# Patient Record
Sex: FEMALE | Race: BLACK OR AFRICAN AMERICAN | NOT HISPANIC OR LATINO | Employment: FULL TIME | URBAN - METROPOLITAN AREA
[De-identification: names, ages, dates, MRNs, and addresses within clinical notes are randomized per-mention and may not be internally consistent; named-entity substitution may affect disease eponyms.]

---

## 2017-03-23 ENCOUNTER — HISTORICAL (OUTPATIENT)
Dept: ADMINISTRATIVE | Facility: HOSPITAL | Age: 52
End: 2017-03-23

## 2017-06-23 ENCOUNTER — HISTORICAL (OUTPATIENT)
Dept: RADIOLOGY | Facility: HOSPITAL | Age: 52
End: 2017-06-23

## 2017-12-21 ENCOUNTER — HISTORICAL (OUTPATIENT)
Dept: RADIOLOGY | Facility: HOSPITAL | Age: 52
End: 2017-12-21

## 2018-10-01 LAB — CRC RECOMMENDATION EXT: NORMAL

## 2018-11-12 ENCOUNTER — HISTORICAL (OUTPATIENT)
Dept: RADIOLOGY | Facility: HOSPITAL | Age: 53
End: 2018-11-12

## 2018-12-28 ENCOUNTER — HISTORICAL (OUTPATIENT)
Dept: ADMINISTRATIVE | Facility: HOSPITAL | Age: 53
End: 2018-12-28

## 2018-12-30 LAB — FINAL CULTURE: NORMAL

## 2019-01-28 ENCOUNTER — HISTORICAL (OUTPATIENT)
Dept: LAB | Facility: HOSPITAL | Age: 54
End: 2019-01-28

## 2019-02-07 LAB
LEFT EYE DM RETINOPATHY: NORMAL
RIGHT EYE DM RETINOPATHY: NORMAL

## 2019-03-07 ENCOUNTER — HISTORICAL (OUTPATIENT)
Dept: ADMINISTRATIVE | Facility: HOSPITAL | Age: 54
End: 2019-03-07

## 2019-08-13 ENCOUNTER — HISTORICAL (OUTPATIENT)
Dept: ADMINISTRATIVE | Facility: HOSPITAL | Age: 54
End: 2019-08-13

## 2019-08-13 LAB
ABS NEUT (OLG): 3.43 X10(3)/MCL (ref 2.1–9.2)
ALBUMIN SERPL-MCNC: 3.2 GM/DL (ref 3.4–5)
ALBUMIN/GLOB SERPL: 0.8 {RATIO}
ALP SERPL-CCNC: 99 UNIT/L (ref 38–126)
ALT SERPL-CCNC: 12 UNIT/L (ref 12–78)
APPEARANCE, UA: CLEAR
AST SERPL-CCNC: 8 UNIT/L (ref 15–37)
BACTERIA SPEC CULT: ABNORMAL /HPF
BASOPHILS # BLD AUTO: 0.1 X10(3)/MCL (ref 0–0.2)
BASOPHILS NFR BLD AUTO: 1 %
BILIRUB SERPL-MCNC: 0.3 MG/DL (ref 0.2–1)
BILIRUB UR QL STRIP: NEGATIVE
BILIRUBIN DIRECT+TOT PNL SERPL-MCNC: 0.1 MG/DL (ref 0–0.2)
BILIRUBIN DIRECT+TOT PNL SERPL-MCNC: 0.2 MG/DL (ref 0–0.8)
BUN SERPL-MCNC: 15 MG/DL (ref 7–18)
CALCIUM SERPL-MCNC: 8.8 MG/DL (ref 8.5–10.1)
CHLORIDE SERPL-SCNC: 106 MMOL/L (ref 98–107)
CHOLEST SERPL-MCNC: 86 MG/DL (ref 0–200)
CHOLEST/HDLC SERPL: 2.7 {RATIO} (ref 0–4)
CO2 SERPL-SCNC: 27 MMOL/L (ref 21–32)
COLOR UR: YELLOW
CREAT SERPL-MCNC: 0.79 MG/DL (ref 0.55–1.02)
CREAT UR-MCNC: 299 MG/DL
EOSINOPHIL # BLD AUTO: 0.2 X10(3)/MCL (ref 0–0.9)
EOSINOPHIL NFR BLD AUTO: 3 %
ERYTHROCYTE [DISTWIDTH] IN BLOOD BY AUTOMATED COUNT: 16.3 % (ref 11.5–17)
EST. AVERAGE GLUCOSE BLD GHB EST-MCNC: 143 MG/DL
GLOBULIN SER-MCNC: 4.2 GM/DL (ref 2.4–3.5)
GLUCOSE (UA): NEGATIVE
GLUCOSE SERPL-MCNC: 100 MG/DL (ref 74–106)
HBA1C MFR BLD: 6.6 % (ref 4.2–6.3)
HCT VFR BLD AUTO: 42.1 % (ref 37–47)
HDLC SERPL-MCNC: 32 MG/DL (ref 35–60)
HGB BLD-MCNC: 12.9 GM/DL (ref 12–16)
HGB UR QL STRIP: NEGATIVE
KETONES UR QL STRIP: NEGATIVE
LDLC SERPL CALC-MCNC: 40 MG/DL (ref 0–129)
LEUKOCYTE ESTERASE UR QL STRIP: ABNORMAL
LYMPHOCYTES # BLD AUTO: 3 X10(3)/MCL (ref 0.6–4.6)
LYMPHOCYTES NFR BLD AUTO: 42 %
MCH RBC QN AUTO: 23.2 PG (ref 27–31)
MCHC RBC AUTO-ENTMCNC: 30.6 GM/DL (ref 33–36)
MCV RBC AUTO: 75.9 FL (ref 80–94)
MICROALBUMIN UR-MCNC: 4.1 MG/DL
MICROALBUMIN/CREAT RATIO PNL UR: 13.7 MG/GM CR (ref 0–30)
MONOCYTES # BLD AUTO: 0.5 X10(3)/MCL (ref 0.1–1.3)
MONOCYTES NFR BLD AUTO: 7 %
NEUTROPHILS # BLD AUTO: 3.43 X10(3)/MCL (ref 2.1–9.2)
NEUTROPHILS NFR BLD AUTO: 47 %
NITRITE UR QL STRIP: NEGATIVE
PH UR STRIP: 5.5 [PH] (ref 5–9)
PLATELET # BLD AUTO: 456 X10(3)/MCL (ref 130–400)
PMV BLD AUTO: 9.3 FL (ref 9.4–12.4)
POTASSIUM SERPL-SCNC: 3.7 MMOL/L (ref 3.5–5.1)
PROT SERPL-MCNC: 7.4 GM/DL (ref 6.4–8.2)
PROT UR QL STRIP: ABNORMAL
RBC # BLD AUTO: 5.55 X10(6)/MCL (ref 4.2–5.4)
RBC #/AREA URNS HPF: ABNORMAL /[HPF]
SODIUM SERPL-SCNC: 142 MMOL/L (ref 136–145)
SP GR UR STRIP: 1.02 (ref 1–1.03)
SQUAMOUS EPITHELIAL, UA: 6 /HPF (ref 0–4)
TRIGL SERPL-MCNC: 72 MG/DL (ref 30–150)
UROBILINOGEN UR STRIP-ACNC: 1
VLDLC SERPL CALC-MCNC: 14 MG/DL
WBC # SPEC AUTO: 7.3 X10(3)/MCL (ref 4.5–11.5)
WBC #/AREA URNS HPF: 6 /HPF (ref 0–3)

## 2019-11-22 ENCOUNTER — HISTORICAL (OUTPATIENT)
Dept: RADIOLOGY | Facility: HOSPITAL | Age: 54
End: 2019-11-22

## 2020-12-16 ENCOUNTER — HISTORICAL (OUTPATIENT)
Dept: RADIOLOGY | Facility: HOSPITAL | Age: 55
End: 2020-12-16

## 2021-01-18 ENCOUNTER — HISTORICAL (OUTPATIENT)
Dept: ADMINISTRATIVE | Facility: HOSPITAL | Age: 56
End: 2021-01-18

## 2022-02-02 ENCOUNTER — HISTORICAL (OUTPATIENT)
Dept: ADMINISTRATIVE | Facility: HOSPITAL | Age: 57
End: 2022-02-02

## 2022-02-02 LAB
ABS NEUT (OLG): 4.36 (ref 2.1–9.2)
ALBUMIN SERPL-MCNC: 3.3 G/DL (ref 3.5–5)
ALBUMIN/GLOB SERPL: 0.8 {RATIO} (ref 1.1–2)
ALP SERPL-CCNC: 89 U/L (ref 40–150)
ALT SERPL-CCNC: 7 U/L (ref 0–55)
APPEARANCE, UA: CLEAR
AST SERPL-CCNC: 9 U/L (ref 5–34)
BACTERIA SPEC CULT: NORMAL
BASOPHILS # BLD AUTO: 0.1 10*3/UL (ref 0–0.2)
BASOPHILS NFR BLD AUTO: 1 %
BILIRUB SERPL-MCNC: 0.5 MG/DL
BILIRUB UR QL STRIP: NEGATIVE
BILIRUBIN DIRECT+TOT PNL SERPL-MCNC: 0.2 (ref 0–0.5)
BILIRUBIN DIRECT+TOT PNL SERPL-MCNC: 0.3 (ref 0–0.8)
BUDDING YEAST: NORMAL
BUN SERPL-MCNC: 16.5 MG/DL (ref 9.8–20.1)
CALCIUM SERPL-MCNC: 9 MG/DL (ref 8.7–10.5)
CASTS, UA: NORMAL
CHLORIDE SERPL-SCNC: 106 MMOL/L (ref 98–107)
CHOLEST SERPL-MCNC: 110 MG/DL
CHOLEST/HDLC SERPL: 4 {RATIO} (ref 0–5)
CO2 SERPL-SCNC: 26 MMOL/L (ref 22–29)
COLOR UR: YELLOW
CREAT SERPL-MCNC: 0.69 MG/DL (ref 0.55–1.02)
CREAT UR-MCNC: 87.5 MG/DL (ref 45–106)
CRYSTALS: NORMAL
DEPRECATED CALCIDIOL+CALCIFEROL SERPL-MC: 38.2 NG/ML (ref 30–80)
EOSINOPHIL # BLD AUTO: 0.3 10*3/UL (ref 0–0.9)
EOSINOPHIL NFR BLD AUTO: 4 %
ERYTHROCYTE [DISTWIDTH] IN BLOOD BY AUTOMATED COUNT: 16 % (ref 11.5–17)
EST. AVERAGE GLUCOSE BLD GHB EST-MCNC: 139.8 MG/DL
GLOBULIN SER-MCNC: 4.1 G/DL (ref 2.4–3.5)
GLUCOSE (UA): NEGATIVE
GLUCOSE SERPL-MCNC: 89 MG/DL (ref 74–100)
HBA1C MFR BLD: 6.5 %
HCT VFR BLD AUTO: 40.8 % (ref 37–47)
HDLC SERPL-MCNC: 27 MG/DL (ref 35–60)
HEMOLYSIS INTERF INDEX SERPL-ACNC: 9
HGB BLD-MCNC: 12.4 G/DL (ref 12–16)
HGB UR QL STRIP: NEGATIVE
ICTERIC INTERF INDEX SERPL-ACNC: 0
KETONES UR QL STRIP: NEGATIVE
LDLC SERPL CALC-MCNC: 68 MG/DL (ref 50–140)
LEUKOCYTE ESTERASE UR QL STRIP: NEGATIVE
LIPEMIC INTERF INDEX SERPL-ACNC: 7
LYMPHOCYTES # BLD AUTO: 2.6 10*3/UL (ref 0.6–4.6)
LYMPHOCYTES NFR BLD AUTO: 33 %
MANUAL DIFF? (OHS): NO
MCH RBC QN AUTO: 23.2 PG (ref 27–31)
MCHC RBC AUTO-ENTMCNC: 30.4 G/DL (ref 33–36)
MCV RBC AUTO: 76.4 FL (ref 80–94)
MICROALBUMIN UR-MCNC: 20.4
MICROALBUMIN/CREAT RATIO PNL UR: 23.3 (ref 0–30)
MONOCYTES # BLD AUTO: 0.6 10*3/UL (ref 0.1–1.3)
MONOCYTES NFR BLD AUTO: 7 %
NEUTROPHILS # BLD AUTO: 4.36 10*3/UL (ref 2.1–9.2)
NEUTROPHILS NFR BLD AUTO: 55 %
NITRITE UR QL STRIP: NEGATIVE
PH UR STRIP: 6 [PH] (ref 5–9)
PLATELET # BLD AUTO: 436 10*3/UL (ref 130–400)
PMV BLD AUTO: 9.6 FL (ref 9.4–12.4)
POTASSIUM SERPL-SCNC: 4 MMOL/L (ref 3.5–5.1)
PROT SERPL-MCNC: 7.4 G/DL (ref 6.4–8.3)
PROT UR QL STRIP: NEGATIVE
RBC # BLD AUTO: 5.34 10*6/UL (ref 4.2–5.4)
RBC #/AREA URNS HPF: NORMAL /[HPF] (ref 0–2)
SMALL ROUND CELLS, UA: NORMAL
SODIUM SERPL-SCNC: 141 MMOL/L (ref 136–145)
SP GR UR STRIP: 1.01 (ref 1–1.03)
SPERM URNS QL MICRO: NORMAL
SQUAMOUS EPITHELIAL, UA: NORMAL (ref 0–4)
TRIGL SERPL-MCNC: 74 MG/DL (ref 37–140)
TSH SERPL-ACNC: 1.67 M[IU]/L (ref 0.35–4.94)
URATE SERPL-MCNC: 7.9 MG/DL (ref 2.6–6)
UROBILINOGEN UR STRIP-ACNC: 0.2
VLDLC SERPL CALC-MCNC: 15 MG/DL
WBC # SPEC AUTO: 8 10*3/UL (ref 4.5–11.5)
WBC #/AREA URNS HPF: NORMAL /[HPF] (ref 0–2)

## 2022-02-18 ENCOUNTER — HISTORICAL (OUTPATIENT)
Dept: RADIOLOGY | Facility: HOSPITAL | Age: 57
End: 2022-02-18

## 2022-02-18 ENCOUNTER — HISTORICAL (OUTPATIENT)
Dept: ADMINISTRATIVE | Facility: HOSPITAL | Age: 57
End: 2022-02-18

## 2022-04-07 LAB
PAP RECOMMENDATION EXT: NORMAL
PAP SMEAR: NORMAL

## 2022-04-10 ENCOUNTER — HISTORICAL (OUTPATIENT)
Dept: ADMINISTRATIVE | Facility: HOSPITAL | Age: 57
End: 2022-04-10
Payer: COMMERCIAL

## 2022-04-29 VITALS
SYSTOLIC BLOOD PRESSURE: 130 MMHG | HEIGHT: 67 IN | OXYGEN SATURATION: 99 % | BODY MASS INDEX: 45.99 KG/M2 | WEIGHT: 293 LBS | DIASTOLIC BLOOD PRESSURE: 86 MMHG

## 2022-06-16 ENCOUNTER — OFFICE VISIT (OUTPATIENT)
Dept: INTERNAL MEDICINE | Facility: CLINIC | Age: 57
End: 2022-06-16
Payer: COMMERCIAL

## 2022-06-16 VITALS
SYSTOLIC BLOOD PRESSURE: 134 MMHG | OXYGEN SATURATION: 97 % | BODY MASS INDEX: 45.99 KG/M2 | HEART RATE: 114 BPM | HEIGHT: 67 IN | DIASTOLIC BLOOD PRESSURE: 79 MMHG | TEMPERATURE: 98 F | WEIGHT: 293 LBS

## 2022-06-16 DIAGNOSIS — S29.012A MUSCLE STRAIN OF RIGHT UPPER BACK, INITIAL ENCOUNTER: Primary | ICD-10-CM

## 2022-06-16 PROBLEM — E66.01 MORBID OBESITY: Chronic | Status: ACTIVE | Noted: 2022-06-16

## 2022-06-16 PROBLEM — I10 HYPERTENSION: Status: ACTIVE | Noted: 2022-06-16

## 2022-06-16 PROBLEM — M12.9 ARTHROPATHY: Status: ACTIVE | Noted: 2022-06-16

## 2022-06-16 PROBLEM — E11.9 TYPE 2 DIABETES MELLITUS WITHOUT COMPLICATION: Status: ACTIVE | Noted: 2018-12-28

## 2022-06-16 PROBLEM — E66.01 MORBID OBESITY: Status: ACTIVE | Noted: 2022-06-16

## 2022-06-16 PROBLEM — I10 HYPERTENSION: Chronic | Status: ACTIVE | Noted: 2022-06-16

## 2022-06-16 PROCEDURE — 3061F PR NEG MICROALBUMINURIA RESULT DOCUMENTED/REVIEW: ICD-10-PCS | Mod: CPTII,,, | Performed by: NURSE PRACTITIONER

## 2022-06-16 PROCEDURE — 3061F NEG MICROALBUMINURIA REV: CPT | Mod: CPTII,,, | Performed by: NURSE PRACTITIONER

## 2022-06-16 PROCEDURE — 96372 PR INJECTION,THERAP/PROPH/DIAG2ST, IM OR SUBCUT: ICD-10-PCS | Mod: ,,, | Performed by: NURSE PRACTITIONER

## 2022-06-16 PROCEDURE — 96372 THER/PROPH/DIAG INJ SC/IM: CPT | Mod: ,,, | Performed by: NURSE PRACTITIONER

## 2022-06-16 PROCEDURE — 99214 OFFICE O/P EST MOD 30 MIN: CPT | Mod: 25,,, | Performed by: NURSE PRACTITIONER

## 2022-06-16 PROCEDURE — 99214 PR OFFICE/OUTPT VISIT, EST, LEVL IV, 30-39 MIN: ICD-10-PCS | Mod: 25,,, | Performed by: NURSE PRACTITIONER

## 2022-06-16 PROCEDURE — 3066F NEPHROPATHY DOC TX: CPT | Mod: CPTII,,, | Performed by: NURSE PRACTITIONER

## 2022-06-16 PROCEDURE — 3075F SYST BP GE 130 - 139MM HG: CPT | Mod: CPTII,,, | Performed by: NURSE PRACTITIONER

## 2022-06-16 PROCEDURE — 1159F MED LIST DOCD IN RCRD: CPT | Mod: CPTII,,, | Performed by: NURSE PRACTITIONER

## 2022-06-16 PROCEDURE — 1159F PR MEDICATION LIST DOCUMENTED IN MEDICAL RECORD: ICD-10-PCS | Mod: CPTII,,, | Performed by: NURSE PRACTITIONER

## 2022-06-16 PROCEDURE — 3078F DIAST BP <80 MM HG: CPT | Mod: CPTII,,, | Performed by: NURSE PRACTITIONER

## 2022-06-16 PROCEDURE — 3075F PR MOST RECENT SYSTOLIC BLOOD PRESS GE 130-139MM HG: ICD-10-PCS | Mod: CPTII,,, | Performed by: NURSE PRACTITIONER

## 2022-06-16 PROCEDURE — 3008F BODY MASS INDEX DOCD: CPT | Mod: CPTII,,, | Performed by: NURSE PRACTITIONER

## 2022-06-16 PROCEDURE — 3066F PR DOCUMENTATION OF TREATMENT FOR NEPHROPATHY: ICD-10-PCS | Mod: CPTII,,, | Performed by: NURSE PRACTITIONER

## 2022-06-16 PROCEDURE — 1160F PR REVIEW ALL MEDS BY PRESCRIBER/CLIN PHARMACIST DOCUMENTED: ICD-10-PCS | Mod: CPTII,,, | Performed by: NURSE PRACTITIONER

## 2022-06-16 PROCEDURE — 3078F PR MOST RECENT DIASTOLIC BLOOD PRESSURE < 80 MM HG: ICD-10-PCS | Mod: CPTII,,, | Performed by: NURSE PRACTITIONER

## 2022-06-16 PROCEDURE — 3008F PR BODY MASS INDEX (BMI) DOCUMENTED: ICD-10-PCS | Mod: CPTII,,, | Performed by: NURSE PRACTITIONER

## 2022-06-16 PROCEDURE — 1160F RVW MEDS BY RX/DR IN RCRD: CPT | Mod: CPTII,,, | Performed by: NURSE PRACTITIONER

## 2022-06-16 RX ORDER — AMLODIPINE BESYLATE 5 MG/1
TABLET ORAL
COMMUNITY
Start: 2022-02-07 | End: 2023-01-23

## 2022-06-16 RX ORDER — KETOROLAC TROMETHAMINE 10 MG/1
10 TABLET, FILM COATED ORAL EVERY 6 HOURS PRN
Qty: 20 TABLET | Refills: 0 | Status: SHIPPED | OUTPATIENT
Start: 2022-06-16 | End: 2022-06-21

## 2022-06-16 RX ORDER — KETOROLAC TROMETHAMINE 30 MG/ML
30 INJECTION, SOLUTION INTRAMUSCULAR; INTRAVENOUS
Status: COMPLETED | OUTPATIENT
Start: 2022-06-16 | End: 2022-06-16

## 2022-06-16 RX ORDER — CYCLOBENZAPRINE HCL 10 MG
10 TABLET ORAL 3 TIMES DAILY PRN
Qty: 30 TABLET | Refills: 0 | Status: SHIPPED | OUTPATIENT
Start: 2022-06-16 | End: 2022-06-26

## 2022-06-16 RX ORDER — ASPIRIN 81 MG/1
81 TABLET ORAL DAILY
COMMUNITY

## 2022-06-16 RX ADMIN — KETOROLAC TROMETHAMINE 30 MG: 30 INJECTION, SOLUTION INTRAMUSCULAR; INTRAVENOUS at 11:06

## 2022-06-16 NOTE — PROGRESS NOTES
Patient ID: [id number]     Chief Complaint: Back Pain and armpit pain         HPI:     Teresita Brian is a 56 y.o. female patient of Dr. Frances who presents to clinic today with c/o right upper back pain x 1 week.  Pain started after reaching down to tie her shoe.  Pain exacerbated while at work on the computer and when she wakes up  In the morning, has not taken anything for pain relief.  Pain described as dull ache.         Past Medical History:  Essential (primary) hypertension  Hypertension  Morbid obesity  Type 2 diabetes mellitus without complication  Type 2 diabetes mellitus without complication, without long-term   current use of insulin  Vitamin D deficiency     Past Surgical History:   Procedure Laterality Date     SECTION         Review of patient's allergies indicates:   Allergen Reactions    Ace inhibitors        Outpatient Medications Marked as Taking for the 22 encounter (Office Visit) with FROY Cowan   Medication Sig Dispense Refill    amLODIPine (NORVASC) 5 MG tablet   See Instructions, TAKE ONE TABLET BY MOUTH DAILY, # 90 tab(s), 4 Refill(s), Pharmacy: [facility], 170, cm, Height/Length Dosing, 21 13:34:00 CST, 147.41, kg, Weight Dosing, 21 13:34:00 CST      aspirin (ECOTRIN) 81 MG EC tablet Take 81 mg by mouth once daily.       Current Facility-Administered Medications for the 22 encounter (Office Visit) with FROY Cowan   Medication Dose Route Frequency Provider Last Rate Last Admin    ketorolac injection 30 mg  30 mg Intramuscular 1 time in Clinic/HOD FROY Cowan           Social History     Socioeconomic History    Marital status:    Tobacco Use    Smoking status: Never Smoker    Smokeless tobacco: Never Used   Substance and Sexual Activity    Alcohol use: Never    Drug use: Never        Family History   Problem Relation Age of Onset    Diabetes Mother     Diabetes Sister     Diabetes Brother      "    Subjective:     ROS      See HPI for details    Constitutional: Denies Change in appetite. Denies Chills. Denies Fever. Denies Night sweats.  Musculoskeletal: + r upper back pain  Integumentary: Denies Rash. Denies Itching. Denies Dry skin.  Neurologic: Denies Dizziness. Denies Fainting. Denies Headache.    All Other ROS: Negative except as stated in HPI.       Objective:     /79 (BP Location: Right arm, Patient Position: Sitting, BP Method: Large (Automatic))   Pulse (!) 114   Temp 98.2 °F (36.8 °C) (Temporal)   Ht 5' 7" (1.702 m)   Wt (!) 144.7 kg (319 lb)   SpO2 97%   BMI 49.96 kg/m²     Physical Exam    General: Alert and oriented, No acute distress.  Head: Normocephalic,   Respiratory:  Symmetrical chest wall expansion.  Musculoskeletal: Normal range of motion cervical spine, cervical spine nontender to palpation.  Right AC joint nontender to palpation.  Paraspinal cervical/thoracic muscles on the right side tender to palpation  Integumentary: Warm, Dry, Intact,   Neurologic: No focal deficits  Psychiatric: Normal interaction, , Appropriate affect         Assessment:       ICD-10-CM ICD-9-CM   1. Muscle strain of right upper back, initial encounter  S29.012A 847.1        Plan:     1. Muscle strain of right upper back, initial encounter  - ketorolac injection 30 mg  - ketorolac (TORADOL) 10 mg tablet; Take 1 tablet (10 mg total) by mouth every 6 (six) hours as needed for Pain.  Dispense: 20 tablet; Refill: 0  - cyclobenzaprine (FLEXERIL) 10 MG tablet; Take 1 tablet (10 mg total) by mouth 3 (three) times daily as needed for Muscle spasms.  Dispense: 30 tablet; Refill: 0  rotate ice and heat  Exercise handout provided         Medication List with Changes/Refills   New Medications    CYCLOBENZAPRINE (FLEXERIL) 10 MG TABLET    Take 1 tablet (10 mg total) by mouth 3 (three) times daily as needed for Muscle spasms.       Start Date: 6/16/2022 End Date: 6/26/2022    KETOROLAC (TORADOL) 10 MG TABLET    " Take 1 tablet (10 mg total) by mouth every 6 (six) hours as needed for Pain.       Start Date: 6/16/2022 End Date: 6/21/2022   Current Medications    AMLODIPINE (NORVASC) 5 MG TABLET      See Instructions, TAKE ONE TABLET BY MOUTH DAILY, # 90 tab(s), 4 Refill(s), Pharmacy: [facility], 170, cm, Height/Length Dosing, 01/20/21 13:34:00 CST, 147.41, kg, Weight Dosing, 01/20/21 13:34:00 CST       Start Date: 2/7/2022  End Date: --    ASPIRIN (ECOTRIN) 81 MG EC TABLET    Take 81 mg by mouth once daily.       Start Date: --        End Date: --          Follow up if symptoms worsen or fail to improve.

## 2022-08-22 ENCOUNTER — HOSPITAL ENCOUNTER (OUTPATIENT)
Dept: CARDIOLOGY | Facility: HOSPITAL | Age: 57
Discharge: HOME | End: 2022-08-22
Attending: INTERNAL MEDICINE
Payer: COMMERCIAL

## 2022-08-22 ENCOUNTER — OFFICE VISIT (OUTPATIENT)
Dept: INTERNAL MEDICINE | Facility: CLINIC | Age: 57
End: 2022-08-22
Payer: COMMERCIAL

## 2022-08-22 VITALS
WEIGHT: 293 LBS | HEART RATE: 115 BPM | BODY MASS INDEX: 45.99 KG/M2 | OXYGEN SATURATION: 98 % | HEIGHT: 67 IN | DIASTOLIC BLOOD PRESSURE: 100 MMHG | SYSTOLIC BLOOD PRESSURE: 140 MMHG

## 2022-08-22 DIAGNOSIS — M79.605 PAIN OF LEFT LOWER EXTREMITY: Primary | ICD-10-CM

## 2022-08-22 DIAGNOSIS — M79.605 PAIN OF LEFT LOWER EXTREMITY: ICD-10-CM

## 2022-08-22 PROCEDURE — 1159F MED LIST DOCD IN RCRD: CPT | Mod: CPTII,,, | Performed by: INTERNAL MEDICINE

## 2022-08-22 PROCEDURE — 1159F PR MEDICATION LIST DOCUMENTED IN MEDICAL RECORD: ICD-10-PCS | Mod: CPTII,,, | Performed by: INTERNAL MEDICINE

## 2022-08-22 PROCEDURE — 3080F DIAST BP >= 90 MM HG: CPT | Mod: CPTII,,, | Performed by: INTERNAL MEDICINE

## 2022-08-22 PROCEDURE — 3008F PR BODY MASS INDEX (BMI) DOCUMENTED: ICD-10-PCS | Mod: CPTII,,, | Performed by: INTERNAL MEDICINE

## 2022-08-22 PROCEDURE — 93971 EXTREMITY STUDY: CPT | Mod: LT

## 2022-08-22 PROCEDURE — 1160F PR REVIEW ALL MEDS BY PRESCRIBER/CLIN PHARMACIST DOCUMENTED: ICD-10-PCS | Mod: CPTII,,, | Performed by: INTERNAL MEDICINE

## 2022-08-22 PROCEDURE — 99214 OFFICE O/P EST MOD 30 MIN: CPT | Mod: ,,, | Performed by: INTERNAL MEDICINE

## 2022-08-22 PROCEDURE — 3061F NEG MICROALBUMINURIA REV: CPT | Mod: CPTII,,, | Performed by: INTERNAL MEDICINE

## 2022-08-22 PROCEDURE — 3008F BODY MASS INDEX DOCD: CPT | Mod: CPTII,,, | Performed by: INTERNAL MEDICINE

## 2022-08-22 PROCEDURE — 3066F NEPHROPATHY DOC TX: CPT | Mod: CPTII,,, | Performed by: INTERNAL MEDICINE

## 2022-08-22 PROCEDURE — 1160F RVW MEDS BY RX/DR IN RCRD: CPT | Mod: CPTII,,, | Performed by: INTERNAL MEDICINE

## 2022-08-22 PROCEDURE — 3066F PR DOCUMENTATION OF TREATMENT FOR NEPHROPATHY: ICD-10-PCS | Mod: CPTII,,, | Performed by: INTERNAL MEDICINE

## 2022-08-22 PROCEDURE — 3077F PR MOST RECENT SYSTOLIC BLOOD PRESSURE >= 140 MM HG: ICD-10-PCS | Mod: CPTII,,, | Performed by: INTERNAL MEDICINE

## 2022-08-22 PROCEDURE — 3061F PR NEG MICROALBUMINURIA RESULT DOCUMENTED/REVIEW: ICD-10-PCS | Mod: CPTII,,, | Performed by: INTERNAL MEDICINE

## 2022-08-22 PROCEDURE — 3080F PR MOST RECENT DIASTOLIC BLOOD PRESSURE >= 90 MM HG: ICD-10-PCS | Mod: CPTII,,, | Performed by: INTERNAL MEDICINE

## 2022-08-22 PROCEDURE — 3077F SYST BP >= 140 MM HG: CPT | Mod: CPTII,,, | Performed by: INTERNAL MEDICINE

## 2022-08-22 PROCEDURE — 99214 PR OFFICE/OUTPT VISIT, EST, LEVL IV, 30-39 MIN: ICD-10-PCS | Mod: ,,, | Performed by: INTERNAL MEDICINE

## 2022-08-22 NOTE — PROGRESS NOTES
Subjective:      Patient ID: Teresita Brian is a 56 y.o. female.    Chief Complaint: Leg Pain      HPI:  This patient is a 56-year-old established patient who presents with anterior thigh pain involving the left lower leg.  She took a trip to [place] within the past several weeks, and she began to have pain in the anterior aspect of her right thigh, shortly after she arrived in [place].  She remembers having to walk a long distance, and at that moment in time, her pain began to develop.  It did not evolve or get worse.  She begin to take an anti-inflammatory agent, and it seemed to help, so she completed her trip, and returned home.  Because her pain persisted, she came in for an evaluation.  She has not had any pulmonary symptoms or complaints.  She has no known history of any venous thrombosis in the past.  She has no involvement of the opposite leg.     The patient's Health Maintenance was reviewed and the following appears to be due at this time:   Health Maintenance Due   Topic Date Due    Hepatitis C Screening  Never done    HIV Screening  Never done    TETANUS VACCINE  Never done    Shingles Vaccine (1 of 2) Never done    COVID-19 Vaccine (4 - Booster for Pfizer series) 02/28/2022        Recent Labwork  No visits with results within 1 Month(s) from this visit.   Latest known visit with results is:   Historical on 02/02/2022   Component Date Value Ref Range Status    Neut % 02/02/2022 55   Final    Lymph % 02/02/2022 33   Final    Mono % 02/02/2022 7   Final    Eos % 02/02/2022 4   Final    Basophil % 02/02/2022 1   Final    Lymph # 02/02/2022 2.6  0.6 - 4.6 Final    Neut # 02/02/2022 4.36  2.10 - 9.20 Final    Mono # 02/02/2022 0.6  0.1 - 1.3 Final    Eos # 02/02/2022 0.3  0.0 - 0.9 Final    Baso # 02/02/2022 0.1  0.0 - 0.2 Final    WBC 02/02/2022 8.0  4.5 - 11.5 Final    RBC 02/02/2022 5.34  4.20 - 5.40 Final    Hgb 02/02/2022 12.4  12.0 - 16.0 Final    Hct 02/02/2022 40.8  37.0 -  47.0 Final    MCV 02/02/2022 76.4  80.0 - 94.0 Final    MCH 02/02/2022 23.2  27.0 - 31.0 Final    MCHC 02/02/2022 30.4  33.0 - 36.0 Final    RDW 02/02/2022 16.0  11.5 - 17.0 Final    Platelet 02/02/2022 436  130 - 400 Final    MPV 02/02/2022 9.6  9.4 - 12.4 Final    Abs Neut 02/02/2022 4.36  2.10 - 9.20 Final    Manual Diff? 02/02/2022 No   Final    Hemoglobin A1c 02/02/2022 6.5  <=7.0 Final    Estimated Average Glucose 02/02/2022 139.8   Final    Color, UA 02/02/2022 YELLOW  YELLOW Final    Appearance, UA 02/02/2022 CLEAR  Clear Final    Specific Gravity,UA 02/02/2022 1.015  1.000 - 1.032 Final    pH, UA 02/02/2022 6.0  5.0 - 9.0 Final    Protein, UA 02/02/2022 Negative  Negative Final    Glucose, UA 02/02/2022 Negative  Negative Final    Ketones, UA 02/02/2022 Negative  Negative Final    Bilirubin (UA) 02/02/2022 Negative  Negative Final    Occult Blood UA 02/02/2022 Negative  Negative Final    Urobilinogen, UA 02/02/2022 0.2   Final    Nitrite, UA 02/02/2022 Negative  Negative Final    Leukocytes, UA 02/02/2022 Negative  Negative Final    WBC, UA 02/02/2022 NONE SEEN  0 - 2 Final    RBC, UA 02/02/2022 NONE SEEN  0 - 2 Final    Squam Epithel, UA 02/02/2022 NONE SEEN  0 - 4 Final    Bacteria 02/02/2022 NONE SEEN  None Seen Final    Yeast, UA 02/02/2022 NOT PRESENT   Final    Sperm, UA 02/02/2022 NOT PRESENT   Final    Crystals 02/02/2022 NOT PRESENT   Final    Small Round Cells, UA 02/02/2022 NOT PRESENT   Final    CASTS, UA 02/02/2022 NONE SEEN   Final    Cholesterol Total 02/02/2022 110  <=200 Final    HDL Cholesterol 02/02/2022 27  35 - 60 Final    Triglyceride 02/02/2022 74  37 - 140 Final    Very Low Density Lipoprotein 02/02/2022 15   Final    Cholesterol/HDL Ratio 02/02/2022 4  0 - 5 Final    LDL Cholesterol 02/02/2022 68.00  50.00 - 140.00 Final    Sodium Level 02/02/2022 141  136 - 145 Final    Potassium Level 02/02/2022 4.0  3.5 - 5.1 Final    Chloride 02/02/2022  106  98 - 107 Final    Carbon Dioxide 2022 26  22 - 29 Final    Glucose Level 2022 89  74 - 100 Final    Blood Urea Nitrogen 2022 16.5  9.8 - 20.1 Final    Creatinine 2022 0.69  0.55 - 1.02 Final    Calcium Level Total 2022 9.0  8.7 - 10.5 Final    Albumin Level 2022 3.3  3.5 - 5.0 Final    Protein Total 2022 7.4  6.4 - 8.3 Final    Globulin 2022 4.1  2.4 - 3.5 Final    Albumin/Globulin Ratio 2022 0.8  1.1 - 2.0 Final    Alkaline Phosphatase 2022 89  40 - 150 Final    Bilirubin Total 2022 0.5  <=1.5 Final    Bilirubin Direct 2022 0.2  0.0 - 0.5 Final    Bilirubin Indirect 2022 0.30  0.00 - 0.80 Final    Aspartate Aminotransferase 2022 9  5 - 34 Final    Alanine Aminotransferase 2022 7  0 - 55 Final    Hemolysis 2022 9   Final    Icterus 2022 0   Final    Lipemia 2022 7   Final    Thyroid Stimulating Hormone 2022 1.6663  0.3500 - 4.9400 Final    Uric Acid 2022 7.9  2.6 - 6.0 Final    Vit D 25 OH 2022 38.2  30.0 - 80.0 Final    Estimated GFR- 2022 >60   Final    Estimated GFR-Non  2022 >60   Final    Urine Microalbumin 2022 20.4  <=30.0 Final    Urine Creatinine 2022 87.5  45.0 - 106.0 Final    Microalbumin Creatinine Ratio 2022 23.3  0.0 - 30.0 Final       Past Medical History:  Past Medical History:   Diagnosis Date    Essential (primary) hypertension     Hypertension 2022    Morbid obesity     Type 2 diabetes mellitus without complication 2018    Type 2 diabetes mellitus without complication, without long-term current use of insulin     Vitamin D deficiency      Past Surgical History:   Procedure Laterality Date     SECTION       Review of patient's allergies indicates:   Allergen Reactions    Ace inhibitors      Current Outpatient Medications on File Prior to Visit   Medication  "Sig Dispense Refill    amLODIPine (NORVASC) 5 MG tablet   See Instructions, TAKE ONE TABLET BY MOUTH DAILY, # 90 tab(s), 4 Refill(s), Pharmacy: [facility], 170, cm, Height/Length Dosing, 01/20/21 13:34:00 CST, 147.41, kg, Weight Dosing, 01/20/21 13:34:00 CST      aspirin (ECOTRIN) 81 MG EC tablet Take 81 mg by mouth once daily.       No current facility-administered medications on file prior to visit.     Social History     Socioeconomic History    Marital status:    Tobacco Use    Smoking status: Never Smoker    Smokeless tobacco: Never Used   Substance and Sexual Activity    Alcohol use: Never    Drug use: Never    Sexual activity: Yes     Family History   Problem Relation Age of Onset    Diabetes Mother     Diabetes Sister     Diabetes Brother        Review of Systems  Review of Systems   Constitutional: Negative.    HENT: Negative.    Eyes: Negative.    Respiratory: Negative.    Cardiovascular: Negative.  As mentioned in the history of present illness  Gastrointestinal: Negative.    Genitourinary: Negative.    Musculoskeletal: Negative.    Neurological: Negative.    Endo/Heme/Allergies: Negative.    Psychiatric/Behavioral: Negative.    Objective:   BP (!) 140/100   Pulse (!) 115   Ht 5' 7" (1.702 m)   Wt (!) 144.2 kg (318 lb)   SpO2 98%   BMI 49.81 kg/m²         Physical Exam  Vitals and nursing note reviewed.   Constitutional:       General: He is not in acute distress.     Appearance: Normal appearance.   HENT:      Head: Normocephalic and atraumatic.      Right Ear: Tympanic membrane and ear canal normal.      Left Ear: Tympanic membrane and ear canal normal.      Nose: Nose normal.      Mouth/Throat:      Pharynx: Oropharynx is clear. No oropharyngeal exudate or posterior oropharyngeal erythema.   Eyes:      Extraocular Movements: Extraocular movements intact.      Pupils: Pupils are equal, round, and reactive to light.   Cardiovascular:      Rate and Rhythm: Normal rate and " regular rhythm.      Pulses: Normal pulses.      Heart sounds: Normal heart sounds. No murmur heard.    No friction rub. No gallop.  There is mild tenderness to  palpation in the left anterior thigh, with out any rigidity or guarding in this area.  This patient does have peripheral edema, but this exist bilaterally, and she is on an antihypertensive medication, amlodipine, that has been associated with peripheral edema.  Pulmonary:      Effort: Pulmonary effort is normal. No respiratory distress.      Breath sounds: Normal breath sounds.   Abdominal:      General: Abdomen is flat. Bowel sounds are normal.      Palpations: Abdomen is soft.   Genitourinary:     Rectum: Normal.   Musculoskeletal:         General: Normal range of motion.      Cervical back: Normal range of motion and neck supple.   Skin:     General: Skin is warm.      Capillary Refill: Capillary refill takes less than 2 seconds.   Neurological:      General: No focal deficit present.      Mental Status: He is alert and oriented to person, place, and time.   Psychiatric:         Mood and Affect: Mood normal.         Behavior: Behavior normal.         Thought Content: Thought content normal.         Judgment: Judgment normal.   Assessment:     1. Pain of left lower extremity      Plan:   I am having Teresita Brian maintain her amLODIPine and aspirin.  Because this patient took such a long trip to [place], and then back to [place], I would like to be sure that she does not have an underlying DVT, which has not been yet identified.  A venous NIVA will be taken, and she did point out that she had some discomfort in the right thigh, but it was less pronounced than in the left.  I will get DVT studies to both lower extremities, to make sure, and provide her with treatment as needed.  If there is no evidence for a DVT , she will be treated as if this is a musculoskeletal problem.  Problem List Items Addressed This Visit    None     Visit Diagnoses      Pain of left lower extremity    -  Primary    Relevant Orders    CV Ultrasound doppler venous DVT leg left          Teresita was seen today for leg pain.    Diagnoses and all orders for this visit:    Pain of left lower extremity  -     CV Ultrasound doppler venous DVT leg left; Future

## 2022-08-23 ENCOUNTER — TELEPHONE (OUTPATIENT)
Dept: INTERNAL MEDICINE | Facility: CLINIC | Age: 57
End: 2022-08-23
Payer: COMMERCIAL

## 2022-08-23 DIAGNOSIS — M79.605 PAIN OF LEFT LOWER EXTREMITY: Primary | ICD-10-CM

## 2022-08-23 RX ORDER — KETOROLAC TROMETHAMINE 10 MG/1
10 TABLET, FILM COATED ORAL 2 TIMES DAILY
Qty: 10 TABLET | Refills: 0 | Status: SHIPPED | OUTPATIENT
Start: 2022-08-23 | End: 2022-08-28

## 2022-08-23 NOTE — TELEPHONE ENCOUNTER
----- Message from Lucia Pierce sent at 8/23/2022  1:58 PM CDT -----  Regarding: call  Pt is returning call (no msg maybe to give US results)  [phone]

## 2022-10-24 ENCOUNTER — CLINICAL SUPPORT (OUTPATIENT)
Dept: BARIATRICS | Facility: HOSPITAL | Age: 57
End: 2022-10-24

## 2022-10-24 VITALS — HEIGHT: 66 IN | BODY MASS INDEX: 47.09 KG/M2 | WEIGHT: 293 LBS

## 2022-10-24 NOTE — PROGRESS NOTES
BEHAVIOR MODIFICATION AND EXERCISE CONSULT    Non Surgical: [] Surgical: [x]      PERSONAL:     What initiated your interest in bariatric surgery?  Tried everything to loose weight. I'm ready for a change.    Marital Status: []Single   []   [x]   []      Do you have children? 2    What is your highest level of education completed? Some college    Who is your social or relational support? family    Do you work? [x]Yes   []No   []Disabled   []Retired    If yes, what is your occupation?     Do you enjoy your work? Love my job    PHYSICAL ACTIVITY:    Do you currently exercise: []Yes   [x]No    If so, please describe:      Have you experienced any injuries and/or restrictions that may limit your physical activity? [x]Yes   []No      If so, please describe: R knee pain due to ART.    How do you feel about exercise? Like it. I like walking outdoors and weights.    BEHAVIORS:    What behavior(s) would you like to change in order to be healthy? Eating healthier, exercising, be better at procrastinating    On a scale of 1-10 (1-extremely low, 10-extremely high), how motivated are you to change your behavior(s)? 10    Do you currently use any type of tobacco products (vape, dip, cigarettes, etc.) No    If yes, on average, how many and/or how often do you use these products on a daily basis?    How many hours of sleep do you average? 7-8    Rate your stress level on a scale of 1-10 (1-extremely low, 10-extremely high) 5    What is your biggest stressor? Mom is ill.    Is your appetite affected by stress? No, boredom    How do you cope and/or manage stress? Get away, walk outside.    Have you ever seen a therapist or counselor? No    NOTES: A body composition was conducted and patient was educated on results. Current weight is 317.5. Patient's goal is 220 lbs. Waist/hip measurements are 60/62 inches. Handout: emotional connections to food and way to replace emotional eating.    Massiel Henry  BS, CPT, CHC

## 2022-10-24 NOTE — PROGRESS NOTES
NUTRITIONAL CONSULT    Initial assessment for  Not sure     Non Surgical: []      PAST HISTORY:   Dieting attempts include Pt has done diets in the past but it was never for her so she could keep up with it.   Highest weight: 325lb    CLINICAL DATA:  Height: 66in  Weight: 318 lbs  IBW: 130 lbs  BMI: 51.33  Bariatric goal weight (125% EBW): 162 lbs  Patient's goal weight: 220 lbs    FAMILY HISTORY OF OBESITY:   []Yes    [x]No            WHAT SIDE OF THE FAMILY?   []Mother   []Father   []Sibling   []Child     []Extended    []Adopted       Goal for Bariatric Surgery: to improve health, to improve quality of life, to lose weight, and to prevent future medical conditions    NUTRITION & HEALTH HISTORY:  Greatest challenge: sweets, starchy CHO, and irregular meal patterns    Current diet recall:   B: coffee with sugar and cream   L: burger and onion rings  -soda    S: soda   D: meat and vegetable sometimes will have some rice       Current Dietary Patterns:  Meal pattern: 2  Snackin  / day Type:   Vegetables: Likes a variety. Eats daily.  Fruits: Likes a variety. Eats once per week.  Beverages: water, soda, and coffee with sugar  Alcohol consumption:  socially  Type:   Dining out: Daily. Mostly fast food, restaurants, and take-out.  Grocery shopping and food prep: [x]Self   [x]Spouse   []Other:   Emotional eating: [x]Yes   []No Which emotions if yes: bored   Nighttime snacking: []Yes   [x]No Middle of night: []Yes   []No  Before bedtime: []Yes   []No  Hx of disordered eating behaviors: []Yes   [x]No Anorexia: []Yes   []No Bulimia: []Yes   []No  Purging: []Yes   []No Binging: []Yes   []No  History of vitamin/mineral deficiencies:   []Yes   [x]No    Vitamins / Minerals / Herbs:   MVI, fish oil B12 biton mg     Food Allergies:   None     ASSESSMENT:  Patient reports attempts at weight loss, only to regain lost weight.  Patient demonstrated knowledge of healthy eating behaviors and exercise patterns; admits to not eating  healthy and not exercising at this point.  Patient states willingness to change lifestyle and make behavior modifications.  Expect good  compliance after surgery at this time.        ESTIMATED NEEDS:  Calories:  8865-8438   (20-25 kcal/kg adjusted BW/d)  Protein: 74-82   (1.0-1.1 g/kg adjusted BW/d)  Fluid:  1480   (20 mL/kg actual BW/d)    BARIATRIC DIET DISCUSSION:  Discussed diet after surgery and related to patients food record.  Reviewed diet progression before and after surgery.  Reinforced that surgery is not a magic bullet and importance of low fat foods and no snacking.  Answered all questions.    RECOMMENDATIONS:  Patient is a good candidate for bariatric surgery.      PLAN:  Work on Bariatric Nutrition Checklist.  Work on expanding variety of vegetables.  1500-calorie diet.  5-6 meals per day.  Reduce frequency of dining out.  Start shopping for bariatric vitamins & minerals.

## 2022-11-04 ENCOUNTER — TELEPHONE (OUTPATIENT)
Dept: INTERNAL MEDICINE | Facility: CLINIC | Age: 57
End: 2022-11-04
Payer: COMMERCIAL

## 2022-11-04 NOTE — TELEPHONE ENCOUNTER
Did some research on the requested injection.    Wegovy Semaglutide injection.  5 doses available from .25mg to 2.4mg.  To only be done weekly.     The patient currently weights 318lbs.  Are you ok with providing this injection?

## 2022-11-04 NOTE — TELEPHONE ENCOUNTER
"----- Message from Lucia Pierce sent at 11/3/2022  3:25 PM CDT -----  Regarding: weight loss  Pt is asking if you could prescribe "Wegovy weight loss diet" this is injections.  She has family members on it and is having success. Gastric sleeve (cost high)  [phone]    "

## 2022-11-04 NOTE — TELEPHONE ENCOUNTER
Per Dr. Frances,    He is not familiar with this injection and is unable to provide a script.  She will need to consult with a weight loss specialists if interested.

## 2022-12-05 ENCOUNTER — DOCUMENTATION ONLY (OUTPATIENT)
Dept: INTERNAL MEDICINE | Facility: CLINIC | Age: 57
End: 2022-12-05
Payer: COMMERCIAL

## 2022-12-20 ENCOUNTER — OFFICE VISIT (OUTPATIENT)
Dept: ORTHOPEDICS | Facility: CLINIC | Age: 57
End: 2022-12-20
Payer: COMMERCIAL

## 2022-12-20 ENCOUNTER — HOSPITAL ENCOUNTER (OUTPATIENT)
Dept: RADIOLOGY | Facility: CLINIC | Age: 57
Discharge: HOME | End: 2022-12-20
Attending: ORTHOPAEDIC SURGERY
Payer: COMMERCIAL

## 2022-12-20 VITALS
DIASTOLIC BLOOD PRESSURE: 105 MMHG | WEIGHT: 293 LBS | SYSTOLIC BLOOD PRESSURE: 158 MMHG | HEART RATE: 112 BPM | BODY MASS INDEX: 45.99 KG/M2 | HEIGHT: 67 IN

## 2022-12-20 DIAGNOSIS — M17.12 PRIMARY OSTEOARTHRITIS OF LEFT KNEE: ICD-10-CM

## 2022-12-20 DIAGNOSIS — M25.562 ACUTE BILATERAL KNEE PAIN: ICD-10-CM

## 2022-12-20 DIAGNOSIS — M25.562 ACUTE BILATERAL KNEE PAIN: Primary | ICD-10-CM

## 2022-12-20 DIAGNOSIS — M25.561 ACUTE BILATERAL KNEE PAIN: ICD-10-CM

## 2022-12-20 DIAGNOSIS — M25.561 ACUTE BILATERAL KNEE PAIN: Primary | ICD-10-CM

## 2022-12-20 DIAGNOSIS — M17.11 PRIMARY OSTEOARTHRITIS OF RIGHT KNEE: ICD-10-CM

## 2022-12-20 PROCEDURE — 20610 DRAIN/INJ JOINT/BURSA W/O US: CPT | Mod: RT,,, | Performed by: ORTHOPAEDIC SURGERY

## 2022-12-20 PROCEDURE — 3066F NEPHROPATHY DOC TX: CPT | Mod: CPTII,,, | Performed by: ORTHOPAEDIC SURGERY

## 2022-12-20 PROCEDURE — 3008F PR BODY MASS INDEX (BMI) DOCUMENTED: ICD-10-PCS | Mod: CPTII,,, | Performed by: ORTHOPAEDIC SURGERY

## 2022-12-20 PROCEDURE — 3008F BODY MASS INDEX DOCD: CPT | Mod: CPTII,,, | Performed by: ORTHOPAEDIC SURGERY

## 2022-12-20 PROCEDURE — 3077F PR MOST RECENT SYSTOLIC BLOOD PRESSURE >= 140 MM HG: ICD-10-PCS | Mod: CPTII,,, | Performed by: ORTHOPAEDIC SURGERY

## 2022-12-20 PROCEDURE — 99204 PR OFFICE/OUTPT VISIT, NEW, LEVL IV, 45-59 MIN: ICD-10-PCS | Mod: 25,,, | Performed by: ORTHOPAEDIC SURGERY

## 2022-12-20 PROCEDURE — 3061F PR NEG MICROALBUMINURIA RESULT DOCUMENTED/REVIEW: ICD-10-PCS | Mod: CPTII,,, | Performed by: ORTHOPAEDIC SURGERY

## 2022-12-20 PROCEDURE — 3066F PR DOCUMENTATION OF TREATMENT FOR NEPHROPATHY: ICD-10-PCS | Mod: CPTII,,, | Performed by: ORTHOPAEDIC SURGERY

## 2022-12-20 PROCEDURE — 73564 XR KNEE COMP 4 OR MORE VIEWS BILAT: ICD-10-PCS | Mod: ,,, | Performed by: ORTHOPAEDIC SURGERY

## 2022-12-20 PROCEDURE — 3077F SYST BP >= 140 MM HG: CPT | Mod: CPTII,,, | Performed by: ORTHOPAEDIC SURGERY

## 2022-12-20 PROCEDURE — 1159F MED LIST DOCD IN RCRD: CPT | Mod: CPTII,,, | Performed by: ORTHOPAEDIC SURGERY

## 2022-12-20 PROCEDURE — 73564 X-RAY EXAM KNEE 4 OR MORE: CPT | Mod: ,,, | Performed by: ORTHOPAEDIC SURGERY

## 2022-12-20 PROCEDURE — 1159F PR MEDICATION LIST DOCUMENTED IN MEDICAL RECORD: ICD-10-PCS | Mod: CPTII,,, | Performed by: ORTHOPAEDIC SURGERY

## 2022-12-20 PROCEDURE — 3080F PR MOST RECENT DIASTOLIC BLOOD PRESSURE >= 90 MM HG: ICD-10-PCS | Mod: CPTII,,, | Performed by: ORTHOPAEDIC SURGERY

## 2022-12-20 PROCEDURE — 3061F NEG MICROALBUMINURIA REV: CPT | Mod: CPTII,,, | Performed by: ORTHOPAEDIC SURGERY

## 2022-12-20 PROCEDURE — 20610 LARGE JOINT ASPIRATION/INJECTION: R KNEE: ICD-10-PCS | Mod: RT,,, | Performed by: ORTHOPAEDIC SURGERY

## 2022-12-20 PROCEDURE — 99204 OFFICE O/P NEW MOD 45 MIN: CPT | Mod: 25,,, | Performed by: ORTHOPAEDIC SURGERY

## 2022-12-20 PROCEDURE — 3080F DIAST BP >= 90 MM HG: CPT | Mod: CPTII,,, | Performed by: ORTHOPAEDIC SURGERY

## 2022-12-20 RX ORDER — BETAMETHASONE SODIUM PHOSPHATE AND BETAMETHASONE ACETATE 3; 3 MG/ML; MG/ML
12 INJECTION, SUSPENSION INTRA-ARTICULAR; INTRALESIONAL; INTRAMUSCULAR; SOFT TISSUE
Status: DISCONTINUED | OUTPATIENT
Start: 2022-12-20 | End: 2022-12-20 | Stop reason: HOSPADM

## 2022-12-20 RX ORDER — LIDOCAINE HYDROCHLORIDE 20 MG/ML
5 INJECTION, SOLUTION EPIDURAL; INFILTRATION; INTRACAUDAL; PERINEURAL
Status: DISCONTINUED | OUTPATIENT
Start: 2022-12-20 | End: 2022-12-20 | Stop reason: HOSPADM

## 2022-12-20 RX ADMIN — BETAMETHASONE SODIUM PHOSPHATE AND BETAMETHASONE ACETATE 12 MG: 3; 3 INJECTION, SUSPENSION INTRA-ARTICULAR; INTRALESIONAL; INTRAMUSCULAR; SOFT TISSUE at 03:12

## 2022-12-20 RX ADMIN — LIDOCAINE HYDROCHLORIDE 5 ML: 20 INJECTION, SOLUTION EPIDURAL; INFILTRATION; INTRACAUDAL; PERINEURAL at 03:12

## 2022-12-20 NOTE — PROCEDURES
Large Joint Aspiration/Injection: R knee    Date/Time: 12/20/2022 3:00 PM  Performed by: Nate Arnold MD  Authorized by: Nate Arnold MD     Consent Done?:  Yes (Verbal)  Indications:  Arthritis  Timeout: prior to procedure the correct patient, procedure, and site was verified    Prep: patient was prepped and draped in usual sterile fashion      Details:  Needle Size:  22 G  Approach:  Anterolateral  Location:  Knee  Site:  R knee  Medications:  5 mL LIDOcaine (PF) 20 mg/mL (2%) 20 mg/mL (2 %); 12 mg betamethasone acetate-betamethasone sodium phosphate 6 mg/mL

## 2022-12-20 NOTE — PROGRESS NOTES
Past Medical History:   Diagnosis Date    Arthritis     Essential (primary) hypertension     Morbid obesity     Osteoarthritis        Past Surgical History:   Procedure Laterality Date     SECTION      COLONOSCOPY W/ POLYPECTOMY  10/01/2018       Current Outpatient Medications   Medication Sig    amLODIPine (NORVASC) 5 MG tablet   See Instructions, TAKE ONE TABLET BY MOUTH DAILY, # 90 tab(s), 4 Refill(s), Pharmacy: [facility], 170, cm, Height/Length Dosing, 21 13:34:00 CST, 147.41, kg, Weight Dosing, 21 13:34:00 CST    aspirin (ECOTRIN) 81 MG EC tablet Take 81 mg by mouth once daily.     No current facility-administered medications for this visit.       Review of patient's allergies indicates:   Allergen Reactions    Ace inhibitors        Family History   Problem Relation Age of Onset    Diabetes Mother     Diabetes Sister     Diabetes Brother        Social History     Socioeconomic History    Marital status:    Tobacco Use    Smoking status: Never    Smokeless tobacco: Never   Substance and Sexual Activity    Alcohol use: Not Currently    Drug use: Never    Sexual activity: Yes     Partners: Male       Chief Complaint:   Chief Complaint   Patient presents with    Knee Pain     Pt has this pain ongoing for about 10 years that worsened in the last year. Pt describes her discomfort as a constant dull/aching pain that aggravates when she is walking or standing after a period sitting. Pt has been applying ice/heat which gives her a temporary relief. Tried to elevate but it hurts after a couple minutes.       History of present illness:  57-year-old female presents today for evaluation of bilateral knee pain.  Patient has longstanding history of bilateral knee pain right worse than left.  It is worse with activity.  Improved by rest.  She is tried anti-inflammatories.  She is tried activity modification.  Despite this she continues to have pain.  No injections in the past.  She is  stopped anti-inflammatories due to issues or concern for renal issues.      Review of Systems:    Constitution: Negative for chills, fever, and sweats.  Negative for unexplained weight loss.    HENT:  Negative for headaches and blurry vision.    Cardiovascular:Negative for chest pain or irregular heart beat. Negative for hypertension.    Respiratory:  Negative for cough and shortness of breath.    Gastrointestinal: Negative for abdominal pain, heartburn, melena, nausea, and vomitting.    Genitourinary:  Negative bladder incontinence and dysuria.    Musculoskeletal:  See HPI    Neurological: Negative for numbness.    Psychiatric/Behavioral: Negative for depression.  The patient is not nervous/anxious.      Endocrine: Negative for polyuria    Hematologic/Lymphatic: Negative for bleeding problem.  Does not bruise/bleed easily.    Skin: Negative for poor would healing and rash      Physical Examination:    Vital Signs:    Vitals:    12/20/22 1508   BP: (!) 158/105   Pulse: (!) 112       Body mass index is 50.59 kg/m².    General: No acute distress, alert and oriented, healthy appearing    HEENT: Head is atraumatic, mucous membranes are moist    Neck: Supples, no JVD    Cardiovascular: Palpable dorsalis pedis and posterior tibial pulses, regular rate and rhythm to those pulses    Lungs: Breathing non-labored    Skin: no rashes appreciated    Neurologic: Can flex and extend knees, ankles, and toes. Sensation is grossly intact    Bilateral knees:  Patient has slight valgus alignment to the right knee.  She has significant crepitus to both knees with range of motion.  Patient can get to full extension.  Flexion of 90° on the right.  One hundred on the left.    X-rays:  Four views of bilateral knees reviewed.  Patient with end-stage arthritis with loss of joint space and bone-on-bone articulation to right worse than left knee.  She has a valgus alignment of the right knee.  Left knee is fairly neutrally aligned      Assessment::  Bilateral knee osteoarthritis    Plan:  Discussed all treatment options the patient.  We will plan to give her an injection to the right knee today.  Left knee she would like to hold off on further intervention at the current time.  We also discussed weight loss options.  She is currently aligned with the bariatric clinic.    This note was created using [facility] voice recognition software that occasionally misinterpreted phrases or words.    Consult note is delivered via Epic messaging service.

## 2023-02-01 DIAGNOSIS — E11.9 TYPE 2 DIABETES MELLITUS WITHOUT COMPLICATION, WITHOUT LONG-TERM CURRENT USE OF INSULIN: ICD-10-CM

## 2023-02-01 DIAGNOSIS — E55.9 VITAMIN D DEFICIENCY: ICD-10-CM

## 2023-02-01 DIAGNOSIS — Z00.00 WELL ADULT EXAM: Primary | ICD-10-CM

## 2023-02-01 DIAGNOSIS — I15.2 HYPERTENSION DUE TO ENDOCRINE DISORDER: Chronic | ICD-10-CM

## 2023-02-07 ENCOUNTER — LAB VISIT (OUTPATIENT)
Dept: LAB | Facility: HOSPITAL | Age: 58
End: 2023-02-07
Attending: INTERNAL MEDICINE
Payer: COMMERCIAL

## 2023-02-07 DIAGNOSIS — E11.9 TYPE 2 DIABETES MELLITUS WITHOUT COMPLICATION, WITHOUT LONG-TERM CURRENT USE OF INSULIN: ICD-10-CM

## 2023-02-07 DIAGNOSIS — Z00.00 WELL ADULT EXAM: ICD-10-CM

## 2023-02-07 DIAGNOSIS — E55.9 VITAMIN D DEFICIENCY: ICD-10-CM

## 2023-02-07 DIAGNOSIS — I15.2 HYPERTENSION DUE TO ENDOCRINE DISORDER: ICD-10-CM

## 2023-02-07 LAB
ALBUMIN SERPL-MCNC: 3.4 G/DL (ref 3.5–5)
ALBUMIN/GLOB SERPL: 0.9 RATIO (ref 1.1–2)
ALP SERPL-CCNC: 84 UNIT/L (ref 40–150)
ALT SERPL-CCNC: 9 UNIT/L (ref 0–55)
APPEARANCE UR: CLEAR
AST SERPL-CCNC: 11 UNIT/L (ref 5–34)
BACTERIA #/AREA URNS AUTO: NORMAL /HPF
BASOPHILS # BLD AUTO: 0.08 X10(3)/MCL (ref 0–0.2)
BASOPHILS NFR BLD AUTO: 0.8 %
BILIRUB UR QL STRIP.AUTO: NEGATIVE MG/DL
BILIRUBIN DIRECT+TOT PNL SERPL-MCNC: 0.3 MG/DL
BUN SERPL-MCNC: 18.8 MG/DL (ref 9.8–20.1)
CALCIUM SERPL-MCNC: 9.5 MG/DL (ref 8.4–10.2)
CHLORIDE SERPL-SCNC: 106 MMOL/L (ref 98–107)
CHOLEST SERPL-MCNC: 109 MG/DL
CHOLEST/HDLC SERPL: 4 {RATIO} (ref 0–5)
CO2 SERPL-SCNC: 25 MMOL/L (ref 22–29)
COLOR UR AUTO: YELLOW
CREAT SERPL-MCNC: 0.79 MG/DL (ref 0.55–1.02)
CREAT UR-MCNC: 93.4 MG/DL (ref 47–110)
DEPRECATED CALCIDIOL+CALCIFEROL SERPL-MC: 33.4 NG/ML (ref 30–80)
EOSINOPHIL # BLD AUTO: 0.36 X10(3)/MCL (ref 0–0.9)
EOSINOPHIL NFR BLD AUTO: 3.8 %
ERYTHROCYTE [DISTWIDTH] IN BLOOD BY AUTOMATED COUNT: 15.9 % (ref 11.5–17)
EST. AVERAGE GLUCOSE BLD GHB EST-MCNC: 139.9 MG/DL
GFR SERPLBLD CREATININE-BSD FMLA CKD-EPI: >60 MLS/MIN/1.73/M2
GLOBULIN SER-MCNC: 3.9 GM/DL (ref 2.4–3.5)
GLUCOSE SERPL-MCNC: 118 MG/DL (ref 74–100)
GLUCOSE UR QL STRIP.AUTO: NEGATIVE MG/DL
HBA1C MFR BLD: 6.5 %
HCT VFR BLD AUTO: 43.6 % (ref 37–47)
HDLC SERPL-MCNC: 27 MG/DL (ref 35–60)
HGB BLD-MCNC: 13.5 GM/DL (ref 12–16)
IMM GRANULOCYTES # BLD AUTO: 0.05 X10(3)/MCL (ref 0–0.04)
IMM GRANULOCYTES NFR BLD AUTO: 0.5 %
KETONES UR QL STRIP.AUTO: NEGATIVE MG/DL
LDLC SERPL CALC-MCNC: 64 MG/DL (ref 50–140)
LEUKOCYTE ESTERASE UR QL STRIP.AUTO: NEGATIVE UNIT/L
LYMPHOCYTES # BLD AUTO: 3.32 X10(3)/MCL (ref 0.6–4.6)
LYMPHOCYTES NFR BLD AUTO: 34.8 %
MCH RBC QN AUTO: 24.2 PG
MCHC RBC AUTO-ENTMCNC: 31 MG/DL (ref 33–36)
MCV RBC AUTO: 78.3 FL (ref 80–94)
MICROALBUMIN UR-MCNC: 24.5 UG/ML
MICROALBUMIN/CREAT RATIO PNL UR: 26.2 MG/GM CR (ref 0–30)
MONOCYTES # BLD AUTO: 0.72 X10(3)/MCL (ref 0.1–1.3)
MONOCYTES NFR BLD AUTO: 7.5 %
NEUTROPHILS # BLD AUTO: 5.02 X10(3)/MCL (ref 2.1–9.2)
NEUTROPHILS NFR BLD AUTO: 52.6 %
NITRITE UR QL STRIP.AUTO: NEGATIVE
NRBC BLD AUTO-RTO: 0 %
PH UR STRIP.AUTO: 6 [PH]
PLATELET # BLD AUTO: 398 X10(3)/MCL (ref 130–400)
PMV BLD AUTO: 9.2 FL (ref 7.4–10.4)
POTASSIUM SERPL-SCNC: 4.2 MMOL/L (ref 3.5–5.1)
PROT SERPL-MCNC: 7.3 GM/DL (ref 6.4–8.3)
PROT UR QL STRIP.AUTO: NEGATIVE MG/DL
RBC # BLD AUTO: 5.57 X10(6)/MCL (ref 4.2–5.4)
RBC #/AREA URNS AUTO: <5 /HPF
RBC UR QL AUTO: NEGATIVE UNIT/L
SODIUM SERPL-SCNC: 142 MMOL/L (ref 136–145)
SP GR UR STRIP.AUTO: 1.01 (ref 1–1.03)
SQUAMOUS #/AREA URNS AUTO: <5 /HPF
TRIGL SERPL-MCNC: 89 MG/DL (ref 37–140)
TSH SERPL-ACNC: 1.71 UIU/ML (ref 0.35–4.94)
UROBILINOGEN UR STRIP-ACNC: 1 MG/DL
VLDLC SERPL CALC-MCNC: 18 MG/DL
WBC # SPEC AUTO: 9.6 X10(3)/MCL (ref 4.5–11.5)
WBC #/AREA URNS AUTO: <5 /HPF

## 2023-02-07 PROCEDURE — 80061 LIPID PANEL: CPT

## 2023-02-07 PROCEDURE — 85025 COMPLETE CBC W/AUTO DIFF WBC: CPT

## 2023-02-07 PROCEDURE — 81001 URINALYSIS AUTO W/SCOPE: CPT

## 2023-02-07 PROCEDURE — 82043 UR ALBUMIN QUANTITATIVE: CPT

## 2023-02-07 PROCEDURE — 83036 HEMOGLOBIN GLYCOSYLATED A1C: CPT

## 2023-02-07 PROCEDURE — 84443 ASSAY THYROID STIM HORMONE: CPT

## 2023-02-07 PROCEDURE — 80053 COMPREHEN METABOLIC PANEL: CPT

## 2023-02-07 PROCEDURE — 82306 VITAMIN D 25 HYDROXY: CPT

## 2023-02-07 PROCEDURE — 36415 COLL VENOUS BLD VENIPUNCTURE: CPT

## 2023-02-08 ENCOUNTER — OFFICE VISIT (OUTPATIENT)
Dept: INTERNAL MEDICINE | Facility: CLINIC | Age: 58
End: 2023-02-08
Payer: COMMERCIAL

## 2023-02-08 VITALS
BODY MASS INDEX: 45.99 KG/M2 | DIASTOLIC BLOOD PRESSURE: 80 MMHG | WEIGHT: 293 LBS | SYSTOLIC BLOOD PRESSURE: 120 MMHG | HEIGHT: 67 IN | HEART RATE: 104 BPM | OXYGEN SATURATION: 96 %

## 2023-02-08 DIAGNOSIS — E66.01 MORBID OBESITY: ICD-10-CM

## 2023-02-08 DIAGNOSIS — E55.9 VITAMIN D DEFICIENCY: ICD-10-CM

## 2023-02-08 DIAGNOSIS — E11.9 TYPE 2 DIABETES MELLITUS WITHOUT COMPLICATION, WITHOUT LONG-TERM CURRENT USE OF INSULIN: ICD-10-CM

## 2023-02-08 DIAGNOSIS — Z00.00 WELL ADULT EXAM: Primary | ICD-10-CM

## 2023-02-08 DIAGNOSIS — I15.2 HYPERTENSION DUE TO ENDOCRINE DISORDER: ICD-10-CM

## 2023-02-08 PROCEDURE — 1159F MED LIST DOCD IN RCRD: CPT | Mod: CPTII,,, | Performed by: INTERNAL MEDICINE

## 2023-02-08 PROCEDURE — 3074F SYST BP LT 130 MM HG: CPT | Mod: CPTII,,, | Performed by: INTERNAL MEDICINE

## 2023-02-08 PROCEDURE — 1160F RVW MEDS BY RX/DR IN RCRD: CPT | Mod: CPTII,,, | Performed by: INTERNAL MEDICINE

## 2023-02-08 PROCEDURE — 3061F NEG MICROALBUMINURIA REV: CPT | Mod: CPTII,,, | Performed by: INTERNAL MEDICINE

## 2023-02-08 PROCEDURE — 99396 PREV VISIT EST AGE 40-64: CPT | Mod: ,,, | Performed by: INTERNAL MEDICINE

## 2023-02-08 PROCEDURE — 99396 PR PREVENTIVE VISIT,EST,40-64: ICD-10-PCS | Mod: ,,, | Performed by: INTERNAL MEDICINE

## 2023-02-08 PROCEDURE — 1159F PR MEDICATION LIST DOCUMENTED IN MEDICAL RECORD: ICD-10-PCS | Mod: CPTII,,, | Performed by: INTERNAL MEDICINE

## 2023-02-08 PROCEDURE — 3008F BODY MASS INDEX DOCD: CPT | Mod: CPTII,,, | Performed by: INTERNAL MEDICINE

## 2023-02-08 PROCEDURE — 3079F DIAST BP 80-89 MM HG: CPT | Mod: CPTII,,, | Performed by: INTERNAL MEDICINE

## 2023-02-08 PROCEDURE — 3074F PR MOST RECENT SYSTOLIC BLOOD PRESSURE < 130 MM HG: ICD-10-PCS | Mod: CPTII,,, | Performed by: INTERNAL MEDICINE

## 2023-02-08 PROCEDURE — 1160F PR REVIEW ALL MEDS BY PRESCRIBER/CLIN PHARMACIST DOCUMENTED: ICD-10-PCS | Mod: CPTII,,, | Performed by: INTERNAL MEDICINE

## 2023-02-08 PROCEDURE — 3008F PR BODY MASS INDEX (BMI) DOCUMENTED: ICD-10-PCS | Mod: CPTII,,, | Performed by: INTERNAL MEDICINE

## 2023-02-08 PROCEDURE — 3061F PR NEG MICROALBUMINURIA RESULT DOCUMENTED/REVIEW: ICD-10-PCS | Mod: CPTII,,, | Performed by: INTERNAL MEDICINE

## 2023-02-08 PROCEDURE — 3079F PR MOST RECENT DIASTOLIC BLOOD PRESSURE 80-89 MM HG: ICD-10-PCS | Mod: CPTII,,, | Performed by: INTERNAL MEDICINE

## 2023-02-08 PROCEDURE — 3066F PR DOCUMENTATION OF TREATMENT FOR NEPHROPATHY: ICD-10-PCS | Mod: CPTII,,, | Performed by: INTERNAL MEDICINE

## 2023-02-08 PROCEDURE — 3066F NEPHROPATHY DOC TX: CPT | Mod: CPTII,,, | Performed by: INTERNAL MEDICINE

## 2023-02-08 NOTE — PROGRESS NOTES
Subjective:      Patient ID: Teresita Brian is a 57 y.o. female.    Chief Complaint: Annual Exam      HPI:This patient is an established patient. Her active problem list and current medication listed in her chart will be reviewed at the time of this visit. This patient's medical illnesses have been well recognized and documented in her chart. A review of systems will be taken at the time of this visit and any new information necessary for her care will be documented. She has done well since her last visit to the office. She has been compliant in taking medication, and refilling her medication on a regular schedule. She had laboratory studies drawn prior to her office visit, and I took the liberty to review these results in detail with her. I have given her a hand written explanation of the results for her records.  This patient leads an active lifestyle, and she has come in today for a wellness examination.  She has enjoyed good health, and she has not had any new problems do develop.  She has not gone to a walk-in clinic or did she need to go to a hospital emergency room for any health care in the recent past.    She has been compliant in taking medication, from which she has not experienced any side effects.       The patient's Health Maintenance was reviewed and the following appears to be due at this time:   Health Maintenance Due   Topic Date Due    Hepatitis C Screening  Never done    Pneumococcal Vaccines (Age 0-64) (1 - PCV) Never done    HIV Screening  Never done    Low Dose Statin  Never done    TETANUS VACCINE  02/02/2009    Shingles Vaccine (1 of 2) Never done    Eye Exam  02/07/2020    COVID-19 Vaccine (4 - Booster for Pfizer series) 12/24/2021    Influenza Vaccine (1) Never done    Mammogram  02/18/2023        Recent Labwork  Lab Visit on 02/07/2023   Component Date Value Ref Range Status    Sodium Level 02/07/2023 142  136 - 145 mmol/L Final    Potassium Level 02/07/2023 4.2  3.5 - 5.1 mmol/L  Final    Chloride 02/07/2023 106  98 - 107 mmol/L Final    Carbon Dioxide 02/07/2023 25  22 - 29 mmol/L Final    Glucose Level 02/07/2023 118 (H)  74 - 100 mg/dL Final    Blood Urea Nitrogen 02/07/2023 18.8  9.8 - 20.1 mg/dL Final    Creatinine 02/07/2023 0.79  0.55 - 1.02 mg/dL Final    Calcium Level Total 02/07/2023 9.5  8.4 - 10.2 mg/dL Final    Protein Total 02/07/2023 7.3  6.4 - 8.3 gm/dL Final    Albumin Level 02/07/2023 3.4 (L)  3.5 - 5.0 g/dL Final    Globulin 02/07/2023 3.9 (H)  2.4 - 3.5 gm/dL Final    Albumin/Globulin Ratio 02/07/2023 0.9 (L)  1.1 - 2.0 ratio Final    Bilirubin Total 02/07/2023 0.3  <=1.5 mg/dL Final    Alkaline Phosphatase 02/07/2023 84  40 - 150 unit/L Final    Alanine Aminotransferase 02/07/2023 9  0 - 55 unit/L Final    Aspartate Aminotransferase 02/07/2023 11  5 - 34 unit/L Final    eGFR 02/07/2023 >60  mls/min/1.73/m2 Final    Hemoglobin A1c 02/07/2023 6.5  <=7.0 % Final    Estimated Average Glucose 02/07/2023 139.9  mg/dL Final    Cholesterol Total 02/07/2023 109  <=200 mg/dL Final    HDL Cholesterol 02/07/2023 27 (L)  35 - 60 mg/dL Final    Triglyceride 02/07/2023 89  37 - 140 mg/dL Final    Cholesterol/HDL Ratio 02/07/2023 4  0 - 5 Final    Very Low Density Lipoprotein 02/07/2023 18   Final    LDL Cholesterol 02/07/2023 64.00  50.00 - 140.00 mg/dL Final    Urine Microalbumin 02/07/2023 24.5  <=30.0 ug/ml Final    Urine Creatinine 02/07/2023 93.4  47.0 - 110.0 mg/dL Final    Microalbumin Creatinine Ratio 02/07/2023 26.2  0.0 - 30.0 mg/gm Cr Final    Thyroid Stimulating Hormone 02/07/2023 1.708  0.350 - 4.940 uIU/mL Final    Color, UA 02/07/2023 Yellow  Yellow, Light-Yellow, Dark Yellow, Marlen, Straw Final    Appearance, UA 02/07/2023 Clear  Clear Final    Specific Gravity, UA 02/07/2023 1.014  1.001 - 1.030 Final    pH, UA 02/07/2023 6.0  5.0 - 8.5 Final    Protein, UA 02/07/2023 Negative  Negative mg/dL Final    Glucose, UA 02/07/2023 Negative  Negative, Normal mg/dL Final     Ketones, UA 2023 Negative  Negative mg/dL Final    Blood, UA 2023 Negative  Negative unit/L Final    Bilirubin, UA 2023 Negative  Negative mg/dL Final    Urobilinogen, UA 2023 1.0  0.2, 1.0, Normal mg/dL Final    Nitrites, UA 2023 Negative  Negative Final    Leukocyte Esterase, UA 2023 Negative  Negative unit/L Final    Vit D 25 OH 2023 33.4  30.0 - 80.0 ng/mL Final    WBC 2023 9.6  4.5 - 11.5 x10(3)/mcL Final    RBC 2023 5.57 (H)  4.20 - 5.40 x10(6)/mcL Final    Hgb 2023 13.5  12.0 - 16.0 gm/dL Final    Hct 2023 43.6  37.0 - 47.0 % Final    MCV 2023 78.3 (L)  80.0 - 94.0 fL Final    MCH 2023 24.2  pg Final    MCHC 2023 31.0 (L)  33.0 - 36.0 mg/dL Final    RDW 2023 15.9  11.5 - 17.0 % Final    Platelet 2023 398  130 - 400 x10(3)/mcL Final    MPV 2023 9.2  7.4 - 10.4 fL Final    Neut % 2023 52.6  % Final    Lymph % 2023 34.8  % Final    Mono % 2023 7.5  % Final    Eos % 2023 3.8  % Final    Basophil % 2023 0.8  % Final    Lymph # 2023 3.32  0.6 - 4.6 x10(3)/mcL Final    Neut # 2023 5.02  2.1 - 9.2 x10(3)/mcL Final    Mono # 2023 0.72  0.1 - 1.3 x10(3)/mcL Final    Eos # 2023 0.36  0 - 0.9 x10(3)/mcL Final    Baso # 2023 0.08  0 - 0.2 x10(3)/mcL Final    IG# 2023 0.05 (H)  0 - 0.04 x10(3)/mcL Final    IG% 2023 0.5  % Final    NRBC% 2023 0.0  % Final    RBC, UA 2023 <5  <=5 /HPF Final    WBC, UA 2023 <5  <=5 /HPF Final    Squamous Epithelial Cells, UA 2023 <5  <=5 /HPF Final    Bacteria, UA 2023 None Seen  None Seen, Rare, Occasional /HPF Final       Past Medical History:  Past Medical History:   Diagnosis Date    Arthritis     Essential (primary) hypertension     Morbid obesity     Osteoarthritis      Past Surgical History:   Procedure Laterality Date     SECTION      COLONOSCOPY W/ POLYPECTOMY  10/01/2018  "    Review of patient's allergies indicates:   Allergen Reactions    Ace inhibitors      Current Outpatient Medications on File Prior to Visit   Medication Sig Dispense Refill    amLODIPine (NORVASC) 5 MG tablet TAKE ONE TABLET BY MOUTH DAILY 90 tablet 4    aspirin (ECOTRIN) 81 MG EC tablet Take 81 mg by mouth once daily.       No current facility-administered medications on file prior to visit.     Social History     Socioeconomic History    Marital status:    Tobacco Use    Smoking status: Never    Smokeless tobacco: Never   Substance and Sexual Activity    Alcohol use: Not Currently    Drug use: Never    Sexual activity: Yes     Partners: Male     Family History   Problem Relation Age of Onset    Diabetes Mother     Diabetes Sister     Diabetes Brother        Review of Systems  Review of Systems   Constitutional: Negative.    HENT: Negative.    Eyes: Negative.    Respiratory: Negative.    Cardiovascular: Negative.    Gastrointestinal: Negative.    Genitourinary: Negative.    Musculoskeletal: Negative.    Neurological: Negative.    Endo/Heme/Allergies: Negative.    Psychiatric/Behavioral: Negative.    Objective:   /80   Pulse 104   Ht 5' 7" (1.702 m)   Wt (!) 145.2 kg (320 lb)   SpO2 96%   BMI 50.12 kg/m²     Protective Sensation (w/ 10 gram monofilament):  Right: Intact  Left: Intact    Visual Inspection:  Normal -  Bilateral    Pedal Pulses:   Right: Present  Left: Present    Posterior tibialis:   Right:Present  Left: Present     Physical Exam  Vitals and nursing note reviewed.   Constitutional:       General: He is not in acute distress.     Appearance: Normal appearance.   HENT:      Head: Normocephalic and atraumatic.      Right Ear: Tympanic membrane and ear canal normal.      Left Ear: Tympanic membrane and ear canal normal.      Nose: Nose normal.      Mouth/Throat:      Pharynx: Oropharynx is clear. No oropharyngeal exudate or posterior oropharyngeal erythema.   Eyes:      Extraocular " Movements: Extraocular movements intact.      Pupils: Pupils are equal, round, and reactive to light.   Cardiovascular:      Rate and Rhythm: Normal rate and regular rhythm.      Pulses: Normal pulses.      Heart sounds: Normal heart sounds. No murmur heard.    No friction rub. No gallop.   Pulmonary:      Effort: Pulmonary effort is normal. No respiratory distress.      Breath sounds: Normal breath sounds.   Abdominal:      General: Abdomen is flat. Bowel sounds are normal.      Palpations: Abdomen is soft.   Genitourinary:     Rectum: Normal.   Musculoskeletal:         General: Normal range of motion.      Cervical back: Normal range of motion and neck supple.   Skin:     General: Skin is warm.      Capillary Refill: Capillary refill takes less than 2 seconds.   Neurological:      General: No focal deficit present.      Mental Status: He is alert and oriented to person, place, and time.   Psychiatric:         Mood and Affect: Mood normal.         Behavior: Behavior normal.         Thought Content: Thought content normal.         Judgment: Judgment normal.   Assessment:     1. Well adult exam    2. Hypertension due to endocrine disorder    3. Type 2 diabetes mellitus without complication, without long-term current use of insulin    4. Vitamin D deficiency    5. Morbid obesity      Plan:   I am having Teresita Brian maintain her aspirin and amLODIPine.This patient is an established patient who has come in for an examination. She has done very well since her last visit to the office. She has a negative review of systems, except as mentioned above. She has not had any new problems to develop in the recent past. I was able to review her laboratory studies with her completely, as mentioned in the history of present illness. I will make medication changes as necessary, and her follow-up will continue as before.  Fortunately, I will not need to make any changes in her general care since she has done extremely well  and she will continue medication as previously given, and continue to exercise and lose weight.  Follow-up will be provided.    This patient should continue to take all medication chronically given for known medical illnesses.      I discussed blood pressure management strategies with the patient today. I also recommend a low salt and low pork diet. We discussed antihypertensive medication. I have stressed an increase in physical activity and exercise.  Exercise is defined as 30 minutes of sustained activity performed at least 3 or 4 days each week.    I have made recommendations regarding better glycemic control with this patient today. The goal is to keep the blood sugar under a hemoglobin A1c of 7. Diet, medication, an increase in exercise or physical activity has been stressed.    40 minutes of total time spent on the encounter, which includes face to face time and non-face to face time preparing to see the patient, obtaining and/or reviewing separately obtained history, documenting clinical information in the electronic or other health record, independently interpreting results and communicating results to the patient/family/caregiver, or care coordination              Problem List Items Addressed This Visit          Cardiac/Vascular    Hypertension (Chronic)       Endocrine    Type 2 diabetes mellitus without complication    Morbid obesity     Other Visit Diagnoses       Well adult exam    -  Primary    Vitamin D deficiency                Teresita was seen today for annual exam.    Diagnoses and all orders for this visit:    Well adult exam    Hypertension due to endocrine disorder    Type 2 diabetes mellitus without complication, without long-term current use of insulin    Vitamin D deficiency    Morbid obesity

## 2023-02-10 ENCOUNTER — PATIENT MESSAGE (OUTPATIENT)
Dept: ADMINISTRATIVE | Facility: HOSPITAL | Age: 58
End: 2023-02-10
Payer: COMMERCIAL

## 2023-02-23 ENCOUNTER — OFFICE VISIT (OUTPATIENT)
Dept: ORTHOPEDICS | Facility: CLINIC | Age: 58
End: 2023-02-23
Payer: COMMERCIAL

## 2023-02-23 VITALS
HEART RATE: 106 BPM | HEIGHT: 67 IN | DIASTOLIC BLOOD PRESSURE: 98 MMHG | BODY MASS INDEX: 45.99 KG/M2 | SYSTOLIC BLOOD PRESSURE: 146 MMHG | WEIGHT: 293 LBS

## 2023-02-23 DIAGNOSIS — M17.11 PRIMARY OSTEOARTHRITIS OF RIGHT KNEE: Primary | ICD-10-CM

## 2023-02-23 PROCEDURE — 3061F NEG MICROALBUMINURIA REV: CPT | Mod: CPTII,,, | Performed by: NURSE PRACTITIONER

## 2023-02-23 PROCEDURE — 1159F PR MEDICATION LIST DOCUMENTED IN MEDICAL RECORD: ICD-10-PCS | Mod: CPTII,,, | Performed by: NURSE PRACTITIONER

## 2023-02-23 PROCEDURE — 99213 OFFICE O/P EST LOW 20 MIN: CPT | Mod: ,,, | Performed by: NURSE PRACTITIONER

## 2023-02-23 PROCEDURE — 99213 PR OFFICE/OUTPT VISIT, EST, LEVL III, 20-29 MIN: ICD-10-PCS | Mod: ,,, | Performed by: NURSE PRACTITIONER

## 2023-02-23 PROCEDURE — 3066F NEPHROPATHY DOC TX: CPT | Mod: CPTII,,, | Performed by: NURSE PRACTITIONER

## 2023-02-23 PROCEDURE — 3077F PR MOST RECENT SYSTOLIC BLOOD PRESSURE >= 140 MM HG: ICD-10-PCS | Mod: CPTII,,, | Performed by: NURSE PRACTITIONER

## 2023-02-23 PROCEDURE — 3008F PR BODY MASS INDEX (BMI) DOCUMENTED: ICD-10-PCS | Mod: CPTII,,, | Performed by: NURSE PRACTITIONER

## 2023-02-23 PROCEDURE — 3008F BODY MASS INDEX DOCD: CPT | Mod: CPTII,,, | Performed by: NURSE PRACTITIONER

## 2023-02-23 PROCEDURE — 3066F PR DOCUMENTATION OF TREATMENT FOR NEPHROPATHY: ICD-10-PCS | Mod: CPTII,,, | Performed by: NURSE PRACTITIONER

## 2023-02-23 PROCEDURE — 3061F PR NEG MICROALBUMINURIA RESULT DOCUMENTED/REVIEW: ICD-10-PCS | Mod: CPTII,,, | Performed by: NURSE PRACTITIONER

## 2023-02-23 PROCEDURE — 3077F SYST BP >= 140 MM HG: CPT | Mod: CPTII,,, | Performed by: NURSE PRACTITIONER

## 2023-02-23 PROCEDURE — 3080F DIAST BP >= 90 MM HG: CPT | Mod: CPTII,,, | Performed by: NURSE PRACTITIONER

## 2023-02-23 PROCEDURE — 1159F MED LIST DOCD IN RCRD: CPT | Mod: CPTII,,, | Performed by: NURSE PRACTITIONER

## 2023-02-23 PROCEDURE — 3080F PR MOST RECENT DIASTOLIC BLOOD PRESSURE >= 90 MM HG: ICD-10-PCS | Mod: CPTII,,, | Performed by: NURSE PRACTITIONER

## 2023-02-23 RX ORDER — MELOXICAM 15 MG/1
15 TABLET ORAL DAILY
Qty: 30 TABLET | Refills: 2 | Status: SHIPPED | OUTPATIENT
Start: 2023-02-23 | End: 2023-12-26 | Stop reason: SDUPTHER

## 2023-02-23 NOTE — PROGRESS NOTES
Chief Complaint:   Chief Complaint   Patient presents with    Follow-up     Pt states the injection lasted for about 5 weeks. Pain aggravates in the cold weather and when she is sitting for too long, standing and walking. Pt is asking If it is possible to have another injection today.       History of present illness: Teresita Brian is a 57 y.o. female, presents to clinic today about 6 weeks status post cortisone injections in the right knee.  States did very well although within the last week did start to have pain back in the knee.  This has also changed with the weather.  Pain is located throughout the knee.  Still not as significant as it was prior to the injection.  Denies being on any anti-inflammatories.  Continues to ambulate without any assistance.    Past Medical History:   Diagnosis Date    Arthritis     Essential (primary) hypertension     Morbid obesity     Osteoarthritis        Past Surgical History:   Procedure Laterality Date     SECTION      COLONOSCOPY W/ POLYPECTOMY  10/01/2018       Current Outpatient Medications   Medication Sig    amLODIPine (NORVASC) 5 MG tablet TAKE ONE TABLET BY MOUTH DAILY    aspirin (ECOTRIN) 81 MG EC tablet Take 81 mg by mouth once daily.    meloxicam (MOBIC) 15 MG tablet Take 1 tablet (15 mg total) by mouth once daily.     No current facility-administered medications for this visit.       Review of patient's allergies indicates:   Allergen Reactions    Ace inhibitors        Family History   Problem Relation Age of Onset    Diabetes Mother     Diabetes Sister     Diabetes Brother        Social History     Socioeconomic History    Marital status:    Tobacco Use    Smoking status: Never    Smokeless tobacco: Never   Substance and Sexual Activity    Alcohol use: Not Currently    Drug use: Never    Sexual activity: Yes     Partners: Male           Review of Systems:    Denies fevers, chills, chest pain, shortness of breath. Comprehensive review of systems  performed and otherwise negative except as noted in HPI     Physical Examination:    General: awake and alert, no acute distress, healthy appearing  Head and Neck: Head atraumatic/normocephalic. Moist MM  CV: brisk cap refill  Lungs: non-labored breathing, w/o cough or SOB  Skin: no rashes present, warm to touch  Neuro: sensation grossly intact distally       Vital Signs:    Vitals:    02/23/23 1506   BP: (!) 146/98   Pulse: 106       Body mass index is 50.28 kg/m².    Bilateral knees:  Patient has slight valgus alignment to the right knee.  She has significant crepitus to both knees with range of motion.  Patient can get to full extension.  Flexion of 90° on the right.  One hundred on the left.     Assessment::Primary OA right knee    Plan:  Patient presents to clinic today about 6-8 weeks status post cortisone injection in the right knee.  This has done well for her.  Although she does continue to have some pain.  We did speak about different options going forward.  She would like to continue with the cortisone injections as these have worked for her.  We will get her back in which she can have another cortisone injection.  We did discuss the option of Zilretta as this would be another option for her if the cortisone injections do not last.  Also a prescription of meloxicam was given to the patient to help in the meantime before her next injection.  Patient states understanding and agrees with plan of care.    This note was created using [employer] voice recognition software that occasionally misinterpreted phrases or words.    Consult note is delivered via Epic messaging service.

## 2023-02-28 DIAGNOSIS — Z12.31 ENCOUNTER FOR SCREENING MAMMOGRAM FOR MALIGNANT NEOPLASM OF BREAST: Primary | ICD-10-CM

## 2023-03-10 ENCOUNTER — HOSPITAL ENCOUNTER (OUTPATIENT)
Dept: RADIOLOGY | Facility: HOSPITAL | Age: 58
Discharge: HOME | End: 2023-03-10
Attending: OBSTETRICS & GYNECOLOGY
Payer: COMMERCIAL

## 2023-03-10 DIAGNOSIS — Z12.31 ENCOUNTER FOR SCREENING MAMMOGRAM FOR MALIGNANT NEOPLASM OF BREAST: ICD-10-CM

## 2023-03-10 PROCEDURE — 77063 MAMMO DIGITAL SCREENING BILAT WITH TOMO: ICD-10-PCS | Mod: 26,,, | Performed by: STUDENT IN AN ORGANIZED HEALTH CARE EDUCATION/TRAINING PROGRAM

## 2023-03-10 PROCEDURE — 77067 MAMMO DIGITAL SCREENING BILAT WITH TOMO: ICD-10-PCS | Mod: 26,,, | Performed by: STUDENT IN AN ORGANIZED HEALTH CARE EDUCATION/TRAINING PROGRAM

## 2023-03-10 PROCEDURE — 77067 SCR MAMMO BI INCL CAD: CPT | Mod: 26,,, | Performed by: STUDENT IN AN ORGANIZED HEALTH CARE EDUCATION/TRAINING PROGRAM

## 2023-03-10 PROCEDURE — 77067 SCR MAMMO BI INCL CAD: CPT | Mod: TC

## 2023-03-10 PROCEDURE — 77063 BREAST TOMOSYNTHESIS BI: CPT | Mod: 26,,, | Performed by: STUDENT IN AN ORGANIZED HEALTH CARE EDUCATION/TRAINING PROGRAM

## 2023-03-13 ENCOUNTER — DOCUMENTATION ONLY (OUTPATIENT)
Dept: ADMINISTRATIVE | Facility: HOSPITAL | Age: 58
End: 2023-03-13
Payer: COMMERCIAL

## 2023-03-14 ENCOUNTER — CLINICAL SUPPORT (OUTPATIENT)
Dept: BARIATRICS | Facility: HOSPITAL | Age: 58
End: 2023-03-14
Payer: COMMERCIAL

## 2023-03-14 ENCOUNTER — OFFICE VISIT (OUTPATIENT)
Dept: BARIATRICS | Facility: HOSPITAL | Age: 58
End: 2023-03-14
Payer: COMMERCIAL

## 2023-03-14 VITALS — BODY MASS INDEX: 50.12 KG/M2 | WEIGHT: 293 LBS

## 2023-03-14 VITALS
HEART RATE: 101 BPM | DIASTOLIC BLOOD PRESSURE: 97 MMHG | BODY MASS INDEX: 47.09 KG/M2 | WEIGHT: 293 LBS | SYSTOLIC BLOOD PRESSURE: 163 MMHG | HEIGHT: 66 IN

## 2023-03-14 DIAGNOSIS — E66.01 SEVERE OBESITY (BMI >= 40): Primary | ICD-10-CM

## 2023-03-14 PROCEDURE — 94200027 HC BARIATRIC 6 MTH MSWL PROGRAM

## 2023-03-14 NOTE — PROGRESS NOTES
[facility]  consult   6 months     Current: 321lb   Current wt 66in   Heights wegiht 325lb     She walks 15 min and then eats lunch and then walks 15 min     Watching what she eats   She feels like she eats the rights foods but her portions are out of control     She has bought food scale to start weighing and wants to start meal prepping       24hr   B: skips  --- coffee with sugar creamer   11am L: fast food   630 D: meat vegetables starch   Some sweet tea some sweet  a little water       Plan:   Eat breakfast with protein     Meal prep through out at the week   No more then 5hrs with out eating  - have a snack by 4pm   Increase water     Protein goal: 75-85g

## 2023-03-14 NOTE — PROGRESS NOTES
A nonsurgical medically supervised weight loss visit was conducted today. Patient is weighed in today at 320 lbs. Her goal is 220 lbs. She has a body fat of 50.5% and a BMI of 52. Ms. Brian does skip meals, particularly breakfast. She did express that she would like to meal prep and feels like she struggles with portion control. Teresita is walking on her lunch break for 20 minutes.    PLAN:  - Patient will walk on her lunch break q.d.  - Patient will practice mindful eating behaviors (handout given).  - Patient will practice meal planning and prepping (handout given).    It is my professional opinion that patient is in the preparation stage of behavior change.    Massiel Henry, CASEY, CPT, CHC

## 2023-03-14 NOTE — PROGRESS NOTES
""want to lose weight"     Ms. Lo is a 58 y/o female pt that present to non surgical weight loss clinic for initial consultation. Pt is interested in losing weight in order to improve co morbid conditions and prevent development of new obesity related co-morbidities, increase life expectancy, increase physical activity, and improve overall quality of life. Pt reports that she does not eat breakfast, usually eats out for lunch and has a protein, veggie, and carb for dinner. Her and her  usually cook. If she does not have something easy to grab she will go to convenience food. Denies snacking or high calorie drinks. No dedicated exercise at this time but will begin walking for lunch. Pt was diagnosed with diabetes by PCP but was never placed on medication since A1c was below 7 always and just used food/diet to keep A1c below 7.     Previous weight loss medication: none, OTC things  Previous Diets: all  Current Medications:  amlodipine, aspirin, mobic  Medical History: HTN, OA, DM type 2  Surgical History: c/s, colonoscopy     Diet: skips meals, eats out for lunch, carb for dinner  Exercise: no dedicated regular exercise but will being walking for lunch again    Labs (2/7/23): A1c: 6.5%, HDL: 27 (L), all other labs WNLs    Ht: 66in  Weights Consult 3/14/23: 320# BMI: 51.65      HTN- yes, amlodipine       HLD- no        DM-  yes, no medication        VALENTE- no        GERD- no   Anticoagulation- yes, ASA     PCP: Dr. Sumanth Frances, retiring in 6 weeks.      Review of Systems   Constitutional: Negative.    Respiratory: Negative.    Cardiovascular: Negative.    Gastrointestinal: Negative.    Endocrine: Negative.    Musculoskeletal: Negative.    Integumentary:  darkening around neck and under arms.   Neurological: Negative.    Psychiatric/Behavioral: Negative.          Objective:      Physical Exam  Vitals and nursing note reviewed.   Constitutional:       Appearance: Normal appearance.   Cardiovascular:      " Rate and Rhythm: Normal rate and regular rhythm.      Pulses: Normal pulses.   Pulmonary:      Effort: Pulmonary effort is normal.      Breath sounds: Normal breath sounds.   Abdominal:      General: Bowel sounds are normal.      Palpations: Abdomen is soft.      Tenderness: There is no abdominal tenderness.   Musculoskeletal:      Right lower leg: +1 pitting edema.      Left lower leg: +1 pitting edema   Skin:     General: Skin is warm and dry. Acanthosis nigricans around neck, under arms, mid thighs.   Neurological:      General: No focal deficit present.      Mental Status: She is alert and oriented to person, place, and time.   Psychiatric:         Mood and Affect: Mood normal.         Behavior: Behavior normal.         Judgment: Judgment normal.     Vital signs reviewed. No acute issues.      Assessment:       Problem List Items Addressed This Visit    Obesity     Visit Diagnoses        Obesity        Plan:       Plan:  Discussed importance of strict dietary compliance as recommended by dietitian. Limit processed foods and starchy CHOs. Eliminate high calorie liquids. Increased water intake. Practice mindful eating patterns. Recommend food journaling for accountability.  Begin exercise. Goal cardio exercise 4-5 days per week for 30-45 mins per encounter. Gradually increase exercise to build stamina and prevent injury.   Discussed types of weight loss medications, risks/benefits of these medications.   Dr. Persaud Rxed Mounjaro 5mg injection  I SQ qwk x's 4 weeks  Disp 4 pens  NR   Discussed with patient the risks of dehydration, kidney injury with dehydration, pancreatitis, hypoglycemia, nausea, and thyroid tumor. Pt will self check thyroid as well as I in clinic monthly and will notify office if they feels any new nodules. Needs to remain very hydrated with 73- 100oz water daily. Decrease meal portion size to avoid nausea. Pt verbalized understanding  Discussed plan of care with medical bariatrician Dr. Pretty  Hung and he was in agreement with proposed treatment plan.   Keep routine scheduled appointments with PCP/specialists.   FU 1 month     Called in ozempic 0.25mg injection since mounjaro was not covered.   Administer 0.25mg SQ qwk x's 4 weeks  Disp 1 box  NR

## 2023-03-15 RX ORDER — TIRZEPATIDE 5 MG/.5ML
5 INJECTION, SOLUTION SUBCUTANEOUS
Qty: 4 PEN | Refills: 0 | Status: SHIPPED | OUTPATIENT
Start: 2023-03-15 | End: 2023-04-14

## 2023-03-16 ENCOUNTER — PATIENT MESSAGE (OUTPATIENT)
Dept: BARIATRICS | Facility: HOSPITAL | Age: 58
End: 2023-03-16
Payer: COMMERCIAL

## 2023-03-20 ENCOUNTER — APPOINTMENT (OUTPATIENT)
Dept: LAB | Facility: HOSPITAL | Age: 58
End: 2023-03-20
Attending: OBSTETRICS & GYNECOLOGY
Payer: COMMERCIAL

## 2023-03-20 DIAGNOSIS — N39.0 URINARY TRACT INFECTION, SITE NOT SPECIFIED: Primary | ICD-10-CM

## 2023-03-20 LAB
APPEARANCE UR: CLEAR
BACTERIA #/AREA URNS AUTO: ABNORMAL /HPF
BILIRUB UR QL STRIP.AUTO: NEGATIVE MG/DL
COLOR UR AUTO: YELLOW
GLUCOSE UR QL STRIP.AUTO: NEGATIVE MG/DL
KETONES UR QL STRIP.AUTO: NEGATIVE MG/DL
LEUKOCYTE ESTERASE UR QL STRIP.AUTO: NEGATIVE UNIT/L
NITRITE UR QL STRIP.AUTO: NEGATIVE
PH UR STRIP.AUTO: 6 [PH]
PROT UR QL STRIP.AUTO: NEGATIVE MG/DL
RBC #/AREA URNS AUTO: <5 /HPF
RBC UR QL AUTO: NEGATIVE UNIT/L
SP GR UR STRIP.AUTO: 1.01 (ref 1–1.03)
SQUAMOUS #/AREA URNS AUTO: <5 /HPF
UROBILINOGEN UR STRIP-ACNC: 0.2 MG/DL
WBC #/AREA URNS AUTO: <5 /HPF

## 2023-03-20 PROCEDURE — 81001 URINALYSIS AUTO W/SCOPE: CPT

## 2023-03-20 PROCEDURE — 87088 URINE BACTERIA CULTURE: CPT

## 2023-03-20 PROCEDURE — 87077 CULTURE AEROBIC IDENTIFY: CPT

## 2023-03-21 ENCOUNTER — OFFICE VISIT (OUTPATIENT)
Dept: ORTHOPEDICS | Facility: CLINIC | Age: 58
End: 2023-03-21
Payer: COMMERCIAL

## 2023-03-21 ENCOUNTER — DOCUMENTATION ONLY (OUTPATIENT)
Dept: ADMINISTRATIVE | Facility: HOSPITAL | Age: 58
End: 2023-03-21
Payer: COMMERCIAL

## 2023-03-21 VITALS
HEIGHT: 66 IN | HEART RATE: 100 BPM | WEIGHT: 293 LBS | DIASTOLIC BLOOD PRESSURE: 103 MMHG | SYSTOLIC BLOOD PRESSURE: 164 MMHG | BODY MASS INDEX: 47.09 KG/M2

## 2023-03-21 DIAGNOSIS — M17.12 PRIMARY OSTEOARTHRITIS OF LEFT KNEE: ICD-10-CM

## 2023-03-21 DIAGNOSIS — M17.11 PRIMARY OSTEOARTHRITIS OF RIGHT KNEE: Primary | ICD-10-CM

## 2023-03-21 PROCEDURE — 3066F PR DOCUMENTATION OF TREATMENT FOR NEPHROPATHY: ICD-10-PCS | Mod: CPTII,,, | Performed by: NURSE PRACTITIONER

## 2023-03-21 PROCEDURE — 1159F PR MEDICATION LIST DOCUMENTED IN MEDICAL RECORD: ICD-10-PCS | Mod: CPTII,,, | Performed by: NURSE PRACTITIONER

## 2023-03-21 PROCEDURE — 1159F MED LIST DOCD IN RCRD: CPT | Mod: CPTII,,, | Performed by: NURSE PRACTITIONER

## 2023-03-21 PROCEDURE — 3008F PR BODY MASS INDEX (BMI) DOCUMENTED: ICD-10-PCS | Mod: CPTII,,, | Performed by: NURSE PRACTITIONER

## 2023-03-21 PROCEDURE — 3061F NEG MICROALBUMINURIA REV: CPT | Mod: CPTII,,, | Performed by: NURSE PRACTITIONER

## 2023-03-21 PROCEDURE — 3077F SYST BP >= 140 MM HG: CPT | Mod: CPTII,,, | Performed by: NURSE PRACTITIONER

## 2023-03-21 PROCEDURE — 3080F PR MOST RECENT DIASTOLIC BLOOD PRESSURE >= 90 MM HG: ICD-10-PCS | Mod: CPTII,,, | Performed by: NURSE PRACTITIONER

## 2023-03-21 PROCEDURE — 3080F DIAST BP >= 90 MM HG: CPT | Mod: CPTII,,, | Performed by: NURSE PRACTITIONER

## 2023-03-21 PROCEDURE — 3061F PR NEG MICROALBUMINURIA RESULT DOCUMENTED/REVIEW: ICD-10-PCS | Mod: CPTII,,, | Performed by: NURSE PRACTITIONER

## 2023-03-21 PROCEDURE — 99213 PR OFFICE/OUTPT VISIT, EST, LEVL III, 20-29 MIN: ICD-10-PCS | Mod: ,,, | Performed by: NURSE PRACTITIONER

## 2023-03-21 PROCEDURE — 1160F RVW MEDS BY RX/DR IN RCRD: CPT | Mod: CPTII,,, | Performed by: NURSE PRACTITIONER

## 2023-03-21 PROCEDURE — 3066F NEPHROPATHY DOC TX: CPT | Mod: CPTII,,, | Performed by: NURSE PRACTITIONER

## 2023-03-21 PROCEDURE — 99213 OFFICE O/P EST LOW 20 MIN: CPT | Mod: ,,, | Performed by: NURSE PRACTITIONER

## 2023-03-21 PROCEDURE — 3008F BODY MASS INDEX DOCD: CPT | Mod: CPTII,,, | Performed by: NURSE PRACTITIONER

## 2023-03-21 PROCEDURE — 3077F PR MOST RECENT SYSTOLIC BLOOD PRESSURE >= 140 MM HG: ICD-10-PCS | Mod: CPTII,,, | Performed by: NURSE PRACTITIONER

## 2023-03-21 PROCEDURE — 1160F PR REVIEW ALL MEDS BY PRESCRIBER/CLIN PHARMACIST DOCUMENTED: ICD-10-PCS | Mod: CPTII,,, | Performed by: NURSE PRACTITIONER

## 2023-03-21 NOTE — PROGRESS NOTES
Chief Complaint:   Chief Complaint   Patient presents with    Follow-up     Pt states that Mobic worked very well for her. Pt takes every other day and denies pain.        History of present illness: Teresita Brian is a 57 y.o. female, presents to clinic today for follow-up of knee pain.  This has significantly improved with the Mobic.  Denies any pain or discomfort today here in clinic.  Is taking the Mobic on an as-needed basis.  No complications with this.    Past Medical History:   Diagnosis Date    Arthritis     Essential (primary) hypertension     Morbid obesity     Osteoarthritis        Past Surgical History:   Procedure Laterality Date     SECTION      COLONOSCOPY W/ POLYPECTOMY  10/01/2018       Current Outpatient Medications   Medication Sig    amLODIPine (NORVASC) 5 MG tablet TAKE ONE TABLET BY MOUTH DAILY    aspirin (ECOTRIN) 81 MG EC tablet Take 81 mg by mouth once daily.    meloxicam (MOBIC) 15 MG tablet Take 1 tablet (15 mg total) by mouth once daily.    tirzepatide (MOUNJARO) 5 mg/0.5 mL PnIj Inject 5 mg into the skin every 7 days.     No current facility-administered medications for this visit.       Review of patient's allergies indicates:   Allergen Reactions    Ace inhibitors Swelling       Family History   Problem Relation Age of Onset    Diabetes Mother     Diabetes Sister     Diabetes Brother        Social History     Socioeconomic History    Marital status:    Tobacco Use    Smoking status: Never    Smokeless tobacco: Never   Substance and Sexual Activity    Alcohol use: Not Currently    Drug use: Never    Sexual activity: Yes     Partners: Male           Review of Systems:    Denies fevers, chills, chest pain, shortness of breath. Comprehensive review of systems performed and otherwise negative except as noted in HPI     Physical Examination:    General: awake and alert, no acute distress, healthy appearing  Head and Neck: Head atraumatic/normocephalic. Moist MM  CV:  brisk cap refill  Lungs: non-labored breathing, w/o cough or SOB  Skin: no rashes present, warm to touch  Neuro: sensation grossly intact distally       Vital Signs:    Vitals:    03/21/23 0816   BP: (!) 164/103   Pulse: 100       Body mass index is 51 kg/m².       Bilateral knees:  Patient has slight valgus alignment to the right knee.  She has significant crepitus to both knees with range of motion.  Patient can get to full extension.  Flexion of 90° on the right.  One hundred on the left.         Assessment::  Primary osteoarthritis bilateral knees    Plan:  Patient presents to clinic today in regards to bilateral knee pain.  This has improved significantly with Mobic.  We will continue the Mobic on an as-needed basis.  Patient was educated to call if she needs any refills.  She states understanding.  If this pain were to worsen despite the Mobic she will call for a possible cortisone injection.  She is yet to receive a cortisone injection.  Patient states understanding and agrees with plan of care.    The above findings, diagnostics, and treatment plan were discussed with Dr. Nate Arnold who is in agreement with the plan of care.      This note was created using [employer] voice recognition software that occasionally misinterpreted phrases or words.    Consult note is delivered via Epic messaging service.

## 2023-03-21 NOTE — PROGRESS NOTES
Uploaded HM Diabetic Exam from [facility] Surgery exam from 02/07/2019

## 2023-03-22 LAB — BACTERIA UR CULT: ABNORMAL

## 2023-03-23 ENCOUNTER — PATIENT MESSAGE (OUTPATIENT)
Dept: BARIATRICS | Facility: HOSPITAL | Age: 58
End: 2023-03-23
Payer: COMMERCIAL

## 2023-04-04 ENCOUNTER — PATIENT MESSAGE (OUTPATIENT)
Dept: ADMINISTRATIVE | Facility: HOSPITAL | Age: 58
End: 2023-04-04
Payer: COMMERCIAL

## 2023-04-21 ENCOUNTER — TELEPHONE (OUTPATIENT)
Dept: FAMILY MEDICINE | Facility: CLINIC | Age: 58
End: 2023-04-21
Payer: COMMERCIAL

## 2023-04-21 NOTE — TELEPHONE ENCOUNTER
Called pt and she voiced she will call them now and fax it to us with ATTN: [email or web address] again. Waiting on that.

## 2023-04-21 NOTE — TELEPHONE ENCOUNTER
----- Message from Ritesh Flood sent at 4/12/2023 10:59 AM CDT -----  .Type:  Needs Medical Advice    Who Called: Teresita  Symptoms (please be specific):    How long has patient had these symptoms:    Pharmacy name and phone #:    Would the patient rather a call back or a response via [facility]?   Best Call Back Number: [phone]  Additional Information: She told me that the [facility] program has sent over some paperwork for her to become a patient of Dr. Gama. I explained to her that I would put a message in for her to get called back.

## 2023-04-24 ENCOUNTER — CLINICAL SUPPORT (OUTPATIENT)
Dept: BARIATRICS | Facility: HOSPITAL | Age: 58
End: 2023-04-24

## 2023-04-24 ENCOUNTER — OFFICE VISIT (OUTPATIENT)
Dept: BARIATRICS | Facility: HOSPITAL | Age: 58
End: 2023-04-24

## 2023-04-24 VITALS
DIASTOLIC BLOOD PRESSURE: 89 MMHG | WEIGHT: 293 LBS | HEIGHT: 66 IN | HEART RATE: 106 BPM | SYSTOLIC BLOOD PRESSURE: 132 MMHG | BODY MASS INDEX: 47.09 KG/M2

## 2023-04-24 VITALS — WEIGHT: 293 LBS | BODY MASS INDEX: 51.04 KG/M2

## 2023-04-24 DIAGNOSIS — E66.01 SEVERE OBESITY (BMI >= 40): Primary | ICD-10-CM

## 2023-04-24 NOTE — PROGRESS NOTES
Patient Education [] [facility] Visit Number:      []  Pre-op Wt Loss Goal (as ordered on referral):   [x] [facility] Visit:   1/6     Current Wt: 316lb - 5lb wt loss   Current BMI:  51.04                                                                      Barriers to learning:  [x]None evident  []Acuity of illness  []Cognitive defects  []Cultural barriers  []Desire/Motivation  []Difficulty concentrating  []Emotional state  []Financial concerns  []Hearing deficit  []Language barrier  []Literacy  []Memory problems  []Vision impairment     Home caregiver present for session   []YES  [] NO       Teaching methods:  []Demonstration  [x]Explanation  [x]Printed materials  []Teach back  []Virtual/web based         Verbalizes understanding Demonstrates  Needs further teaching Needs practice/ supervision Comments    Bariatric Surgery Diet  [] [] [] []    Clear liquid [] [] [] []    Full liquid [] [] [] []    Weight Reduction [x] [] [] []    Other Diet [] [] [] []          Additional Learner (s) Present                                                                  []Spouse   []Daughter    []  Son  []Family member   []Friend   []Grandfather   []Grandmother   []Father   []Mother   []Other       Expected Compliance:  [x]Good  []Fair  []Poor    Additional Information:    she  started to use a portion plate to help reduce dinner size and states the snack before dinner is helping her not over eat as much.   Increase 90oz water and using SF packet   Over all she is doing well       24 hr recall:  B: protein shake and fruit   L:   grilled chicken wrap   S: wilfrid bar   D:  meat vegetable and a little starch     Plan:   1. Continue current meal plan   2. Limit sugar in protein bar snack

## 2023-04-24 NOTE — PROGRESS NOTES
A medically supervised weight loss visit was conducted today.  Patient's weight is 316.2. A body composition was conducted and patient was educated. Teresita started eating BF She eats Cory Bars for a midafternoon snack. Goes to the gym q.o.d. Does the bike and weights for 45-60 min. Feeling better. Joined [employer] and plans to attend support group meetings. Brings lunch to work (salad, grilled chicken wraps, soup, crackers/ham/cheese). She does practice mindful eating behaviors by drinking water or doing something if she feels like she is not physically hungry.      PLAN:  - Patient will continue with current exercise regimen.  - Patient will practice mindful eating behaviors.  - Patient will follow dietary advice from Janeen Lawson RD.    It is my professional opinion that patient is in the preparation stage of behavior change.    CASEY Gaspar, CPT, CHC

## 2023-04-24 NOTE — PROGRESS NOTES
""want to lose weight"      Ms. Lo is a 58 y/o female pt that present to non surgical weight loss clinic for weight check and medication management. Pt was unable to get her ozempic Rx and asking if it can be prescribed to a different pharmacy with the diagnosis code of diabetes. Pt has limited her portion size, watched watch she ate, limited carbs, actually eating breakfast, and has lost 5# which she is very excited about! Pt also began at the gym 3 days a week.      Previous weight loss medication: none, OTC things  Previous Diets: all  Current Medications:  amlodipine, aspirin, mobic  Medical History: HTN, OA, DM type 2  Surgical History: c/s, colonoscopy      Diet: skips meals, eats out for lunch, carb for dinner  Exercise: no dedicated regular exercise but will being walking for lunch again     Labs (2/7/23): A1c: 6.5%, HDL: 27 (L), all other labs WNLs     Ht: 66in  Weights Consult 3/14/23: 320#          BMI: 51.65       HTN- yes, amlodipine       HLD- no        DM-  yes, no medication        VALENTE- no        GERD- no   Anticoagulation- yes, ASA      PCP: Dr. Sumanth Frances, retiring in 6 weeks.                 Review of Systems   Constitutional: Negative.    Respiratory: Negative.    Cardiovascular: Negative.    Gastrointestinal: Negative.    Endocrine: Negative.    Musculoskeletal: Negative.    Integumentary:  darkening around neck and under arms.   Neurological: Negative.    Psychiatric/Behavioral: Negative.           Objective:      Physical Exam  Vitals and nursing note reviewed.   Constitutional:       Appearance: Normal appearance.   Cardiovascular:      Rate and Rhythm: Normal rate and regular rhythm.      Pulses: Normal pulses.   Pulmonary:      Effort: Pulmonary effort is normal.      Breath sounds: Normal breath sounds.   Abdominal:      General: Bowel sounds are normal.      Palpations: Abdomen is soft.      Tenderness: There is no abdominal tenderness.   Musculoskeletal:      Right lower leg: " +1 pitting edema.      Left lower leg: +1 pitting edema   Skin:     General: Skin is warm and dry. Acanthosis nigricans around neck, under arms, mid thighs.   Neurological:      General: No focal deficit present.      Mental Status: She is alert and oriented to person, place, and time.   Psychiatric:         Mood and Affect: Mood normal.         Behavior: Behavior normal.         Judgment: Judgment normal.      Vital signs reviewed. No acute issues.        Assessment:       Problem List Items Addressed This Visit    Obesity      Visit Diagnoses          Obesity        Plan:       Plan:  Great job with change in dietary habits, limiting portion size, and watching carbs  Great job with beginning at the gym every other day, continue!   Discussed types of weight loss medications, risks/benefits of these medications.   Dr. Persaud to call in Ozempic 0.25mg injection, diagnosis diabetes   Administer 0.25mg SQ qwk x's 4 weeks  Disp 1 box  NR  Discussed with patient the risks of dehydration, kidney injury with dehydration, pancreatitis, hypoglycemia, nausea, and thyroid tumor. Pt will self check thyroid as well as I in clinic monthly and will notify office if they feels any new nodules. Needs to remain very hydrated with 73- 100oz water daily. Decrease meal portion size to avoid nausea. Pt verbalized understanding  Discussed plan of care with medical bariatrician Dr. Sukhwinder Persaud and he was in agreement with proposed treatment plan.   Keep routine scheduled appointments with PCP/specialists.   FU 1 month

## 2023-04-25 RX ORDER — SEMAGLUTIDE 0.68 MG/ML
0.5 INJECTION, SOLUTION SUBCUTANEOUS
Qty: 3 ML | Refills: 0 | Status: SHIPPED | OUTPATIENT
Start: 2023-04-25 | End: 2023-05-22

## 2023-05-22 ENCOUNTER — OFFICE VISIT (OUTPATIENT)
Dept: BARIATRICS | Facility: HOSPITAL | Age: 58
End: 2023-05-22

## 2023-05-22 ENCOUNTER — CLINICAL SUPPORT (OUTPATIENT)
Dept: BARIATRICS | Facility: HOSPITAL | Age: 58
End: 2023-05-22

## 2023-05-22 VITALS
HEART RATE: 96 BPM | HEIGHT: 66 IN | DIASTOLIC BLOOD PRESSURE: 99 MMHG | WEIGHT: 293 LBS | SYSTOLIC BLOOD PRESSURE: 157 MMHG | BODY MASS INDEX: 47.09 KG/M2

## 2023-05-22 VITALS — WEIGHT: 293 LBS | BODY MASS INDEX: 47.09 KG/M2 | HEIGHT: 66 IN

## 2023-05-22 DIAGNOSIS — E66.01 SEVERE OBESITY (BMI >= 40): Primary | ICD-10-CM

## 2023-05-22 NOTE — PROGRESS NOTES
Patient Education [] [facility] Visit Number:      []  Pre-op Wt Loss Goal (as ordered on referral):   [x] [facility] Visit: 2/6    Current Wt: 314#  Current BMI: 50.6 kg/m2                                                                      Barriers to learning:  [x]None evident  []Acuity of illness  []Cognitive defects  []Cultural barriers  []Desire/Motivation  []Difficulty concentrating  []Emotional state  []Financial concerns  []Hearing deficit  []Language barrier  []Literacy  []Memory problems  []Vision impairment     Home caregiver present for session   []YES  [x] NO       Teaching methods:  []Demonstration  [x]Explanation  []Printed materials  []Teach back  []Virtual/web based         Verbalizes understanding Demonstrates  Needs further teaching Needs practice/ supervision Comments    Bariatric Surgery Diet  [] [] [] []    Clear liquid [] [] [] []    Full liquid [] [] [] []    Weight Reduction [] [] [] []    Other Diet [] [] [] []          Additional Learner (s) Present                                                                  []Spouse   []Daughter    []  Son  []Family member   []Friend   []Grandfather   []Grandmother   []Father   []Mother   []Other       Expected Compliance:  [x]Good  []Fair  []Poor    Additional Information:    Pt with 2# wt loss. Pt participating in virtual chair exercises or attends gym for exercise. Pt has continued to use portion plate, however feels she will push the limits at times. Discussed adding more non-starchy vegetables to add fiber and satiety with little calories.Pt drinking approx 60-75 fl oz water and SF flavored water.    Plan:  Continue current meal plan.  Increase exercise consistency.  Increase non-starchy vegetable intake.

## 2023-05-23 NOTE — PROGRESS NOTES
""want to lose weight"      Ms. Lo is a 58 y/o female pt that present to non surgical weight loss clinic for weight check and medication management. Pt was unable to get her ozempic Rx. Pt has limited her portion size, watched watch she ate, limited carbs, actually eating breakfast. Pt's knee was giving her issues this month so the gym did not happen as much as last month.      Previous weight loss medication: none, OTC things  Previous Diets: all  Current Medications:  amlodipine, aspirin, mobic  Medical History: HTN, OA, DM type 2  Surgical History: c/s, colonoscopy      Diet: skips meals, eats out for lunch, carb for dinner  Exercise: no dedicated regular exercise but will being walking for lunch again     Labs (2/7/23): A1c: 6.5%, HDL: 27 (L), all other labs WNLs     Ht: 66in  Weights Consult 3/14/23: 320#          BMI: 51.65  Visit #1 4/24/23: 316#  Visit #2 5/22/23: 314#   BMI: 50.68       HTN- yes, amlodipine       HLD- no        DM-  yes, no medication        VALENTE- no        GERD- no   Anticoagulation- yes, ASA      PCP: Dr. Sumanth Frances, retiring in 6 weeks.                 Review of Systems   Constitutional: Negative.    Respiratory: Negative.    Cardiovascular: Negative.    Gastrointestinal: Negative.    Endocrine: Negative.    Musculoskeletal: Negative.    Integumentary:  darkening around neck and under arms.   Neurological: Negative.    Psychiatric/Behavioral: Negative.           Objective:      Physical Exam  Vitals and nursing note reviewed.   Constitutional:       Appearance: Normal appearance.   Cardiovascular:      Rate and Rhythm: Normal rate and regular rhythm.      Pulses: Normal pulses.   Pulmonary:      Effort: Pulmonary effort is normal.      Breath sounds: Normal breath sounds.   Abdominal:      General: Bowel sounds are normal.      Palpations: Abdomen is soft.      Tenderness: There is no abdominal tenderness.   Musculoskeletal:      Right lower leg: +1 pitting edema.      Left " lower leg: +1 pitting edema   Skin:     General: Skin is warm and dry. Acanthosis nigricans around neck, under arms, mid thighs.   Neurological:      General: No focal deficit present.      Mental Status: She is alert and oriented to person, place, and time.   Psychiatric:         Mood and Affect: Mood normal.         Behavior: Behavior normal.         Judgment: Judgment normal.      Vitals:    05/22/23 1048   BP: (!) 157/99   Pulse: 96        Assessment:       Problem List Items Addressed This Visit    Obesity      Visit Diagnoses          Obesity        Plan:       Plan:  Great job with change in dietary habits, limiting portion size, and watching carbs  Great job with beginning at the gym every other day, continue!   Discussed types of weight loss medications, risks/benefits of these medications.   Dr. Persaud to call in Ozempic 0.25mg injection, diagnosis diabetes. Pt was unable to fill it last month due to issues with her pharmacy.   Administer 0.25mg SQ qwk x's 4 weeks  Disp 1 box  NR  Discussed with patient the risks of dehydration, kidney injury with dehydration, pancreatitis, hypoglycemia, nausea, and thyroid tumor. Pt will self check thyroid as well as I in clinic monthly and will notify office if they feels any new nodules. Needs to remain very hydrated with 73- 100oz water daily. Decrease meal portion size to avoid nausea. Pt verbalized understanding  Discussed plan of care with medical bariatrician Dr. Sukhwinder Persaud and he was in agreement with proposed treatment plan.   Keep routine scheduled appointments with PCP/specialists.   FU 1 month

## 2023-06-13 ENCOUNTER — CLINICAL SUPPORT (OUTPATIENT)
Dept: BARIATRICS | Facility: HOSPITAL | Age: 58
End: 2023-06-13

## 2023-06-13 ENCOUNTER — OFFICE VISIT (OUTPATIENT)
Dept: BARIATRICS | Facility: HOSPITAL | Age: 58
End: 2023-06-13

## 2023-06-13 VITALS
HEIGHT: 66 IN | DIASTOLIC BLOOD PRESSURE: 96 MMHG | WEIGHT: 293 LBS | BODY MASS INDEX: 47.09 KG/M2 | HEART RATE: 97 BPM | SYSTOLIC BLOOD PRESSURE: 147 MMHG

## 2023-06-13 DIAGNOSIS — E66.01 SEVERE OBESITY (BMI >= 40): Primary | ICD-10-CM

## 2023-06-13 RX ORDER — AMLODIPINE AND VALSARTAN 5; 160 MG/1; MG/1
1 TABLET ORAL EVERY MORNING
COMMUNITY
Start: 2023-06-01

## 2023-06-13 NOTE — PROGRESS NOTES
""want to lose weight"      Ms. Lo is a 58 y/o female pt that present to non surgical weight loss clinic for weight check and medication management. Pt has been on oEmpic 0.25mg injection for 2 weeks now and denies much change in cravings or portion size change so she is ready to increase dose. Pt has done well with diet otherwise. Denies increase in exercise or activity since last visit.      Previous weight loss medication: none, OTC things  Previous Diets: all  Current Medications:  amlodipine, aspirin, mobic  Medical History: HTN, OA, DM type 2  Surgical History: c/s, colonoscopy      Diet: skips meals, eats out for lunch, carb for dinner  Exercise: no dedicated regular exercise but will being walking for lunch again     Labs (2/7/23): A1c: 6.5%, HDL: 27 (L), all other labs WNLs     Ht: 66in  Weights Consult 3/14/23: 320#          BMI: 51.65  Visit #1 4/24/23: 316#  Visit #2 5/22/23: 314#                         BMI: 50.68  Visit #3 6/13/23: 312#   BMI: 50.39       HTN- yes, amlodipine       HLD- no        DM-  yes, no medication        VALENTE- no        GERD- no   Anticoagulation- yes, ASA      PCP: Dr. Sumanth Frances, retiring in 6 weeks.                 Review of Systems   Constitutional: Negative.    Respiratory: Negative.    Cardiovascular: Negative.    Gastrointestinal: Negative.      Musculoskeletal: Negative.    Integumentary:  darkening around neck and under arms.   Neurological: Negative.    Psychiatric/Behavioral: Negative.           Objective:      Physical Exam  Vitals and nursing note reviewed.   Constitutional:       Appearance: Normal appearance.   Cardiovascular:      Rate and Rhythm: Normal rate and regular rhythm.      Pulses: Normal pulses.   Pulmonary:      Effort: Pulmonary effort is normal.      Breath sounds: Normal breath sounds.   Abdominal:      General: Bowel sounds are normal.      Palpations: Abdomen is soft.      Tenderness: There is no abdominal tenderness.   Musculoskeletal: "      Right lower leg: +1 pitting edema.      Left lower leg: +1 pitting edema   Skin:     General: Skin is warm and dry. Acanthosis nigricans around neck, under arms, mid thighs.   Neurological:      General: No focal deficit present.      Mental Status: She is alert and oriented to person, place, and time.   Psychiatric:         Mood and Affect: Mood normal.         Behavior: Behavior normal.         Judgment: Judgment normal.      Vitals:    06/13/23 1324   BP: (!) 147/96   Pulse: 97        Assessment:       Problem List Items Addressed This Visit    Obesity      Visit Diagnoses          Obesity        Plan:       Plan:  Great job with change in dietary habits, limiting portion size, and watching carbs  Needs to increase exercise this month   Discussed types of weight loss medications, risks/benefits of these medications.   Dr. Persaud to call in Ozempic 0.5mg injection, diagnosis diabetes. Pt was unable to fill it last month due to issues with her pharmacy.   Administer 0.5mg SQ qwk x's 4 weeks  Disp 1 box  1 refill, since pt will not see me for 6 week. Call at 4 week period if you want to increase to 1mg injection.   Discussed with patient the risks of dehydration, kidney injury with dehydration, pancreatitis, hypoglycemia, nausea, and thyroid tumor. Pt will self check thyroid as well as I in clinic monthly and will notify office if they feels any new nodules. Needs to remain very hydrated with 73- 100oz water daily. Decrease meal portion size to avoid nausea. Pt verbalized understanding  Discussed plan of care with medical bariatrician Dr. Sukhwinder Persaud and he was in agreement with proposed treatment plan.   Keep routine scheduled appointments with PCP/specialists.   FU 1 month

## 2023-06-13 NOTE — PROGRESS NOTES
Patient Education  [x] NSWL Visit     Current Wt:   Wt Readings from Last 3 Encounters:   06/13/23 1324 (!) 141.6 kg (312 lb 3.2 oz)   05/22/23 1142 (!) 142.4 kg (314 lb)   05/22/23 1048 (!) 142.4 kg (314 lb)      Current BMI:   BMI Readings from Last 1 Encounters:   06/13/23 50.39 kg/m²                                                                         Barriers to learning:  [x]None evident  []Acuity of illness  []Cognitive defects  []Cultural barriers  []Desire/Motivation  []Difficulty concentrating  []Emotional state  []Financial concerns  []Hearing deficit  []Language barrier  []Literacy  []Memory problems  []Vision impairment     Home caregiver present for session   []YES  [x] NO       Teaching methods:  []Demonstration  [x]Explanation  [x]Printed materials  []Teach back  []Virtual/web based         Verbalizes understanding Demonstrates  Needs further teaching Needs practice/ supervision Comments    Bariatric Surgery Diet  [] [] [] []    Clear liquid [] [] [] []    Full liquid [] [] [] []    Weight Reduction [x] [] [] []    Other Diet [] [] [] []          Additional Learner (s) Present                                                                  []Spouse   []Daughter    []  Son  []Family member   []Friend   []Grandfather   []Grandmother   []Father   []Mother   []Other       Expected Compliance:  [x]Good  []Fair  []Poor    Additional Information:      Portion control compared to before. Purchased food scale; just started using it on Saturday. Has arthritis and slip disk in back, will start physical therapy soon. Eating 60 grams or less of protein a day.     24 hr recall:  530 am:   7 am:skips 4+ days a week: 3 days of the week have protein shake or scrambled eggs with spinach  11 am: leftovers: grilled meat, vegetables (grilled 6 ounces chicken thigh and salad with vinagrette dressing, and an orange)  1 oz bag of peanuts  6:00 pm Scrambled eggs and english muffin    Plan:  Incorporate 4 oz protein  source (approx. 20-30 g) at all meals. , Weigh all protein measurements on food scale, Increase non-starchy vegetables to 2 cups for lunch and supper or half a plate. , Eat every 4-6 hours

## 2023-07-17 NOTE — TELEPHONE ENCOUNTER
Teresita Brian called requesting refill of Ozempic and desires to increase to 1 mg dosage.  Pt denies any side effects of medication with the exception of some mild nausea the evening of injection.

## 2023-07-18 RX ORDER — SEMAGLUTIDE 1.34 MG/ML
1 INJECTION, SOLUTION SUBCUTANEOUS
Qty: 3 ML | Refills: 0 | Status: SHIPPED | OUTPATIENT
Start: 2023-07-18 | End: 2023-07-25 | Stop reason: SDUPTHER

## 2023-07-25 ENCOUNTER — CLINICAL SUPPORT (OUTPATIENT)
Dept: BARIATRICS | Facility: HOSPITAL | Age: 58
End: 2023-07-25

## 2023-07-25 ENCOUNTER — OFFICE VISIT (OUTPATIENT)
Dept: BARIATRICS | Facility: HOSPITAL | Age: 58
End: 2023-07-25

## 2023-07-25 VITALS
BODY MASS INDEX: 47.09 KG/M2 | SYSTOLIC BLOOD PRESSURE: 136 MMHG | HEIGHT: 66 IN | DIASTOLIC BLOOD PRESSURE: 84 MMHG | HEART RATE: 104 BPM | WEIGHT: 293 LBS

## 2023-07-25 DIAGNOSIS — E66.01 SEVERE OBESITY (BMI >= 40): Primary | ICD-10-CM

## 2023-07-25 RX ORDER — SEMAGLUTIDE 1.34 MG/ML
1 INJECTION, SOLUTION SUBCUTANEOUS
Qty: 3 ML | Refills: 0 | Status: SHIPPED | OUTPATIENT
Start: 2023-07-25 | End: 2023-08-16 | Stop reason: SDUPTHER

## 2023-07-25 NOTE — PROGRESS NOTES
Patient Educatio  [] NS[facility] Visit     Current Wt:   Wt Readings from Last 3 Encounters:   07/25/23 1037 (!) 137.7 kg (303 lb 9.6 oz)   06/13/23 1324 (!) 141.6 kg (312 lb 3.2 oz)   05/22/23 1142 (!) 142.4 kg (314 lb)      Current BMI:   BMI Readings from Last 1 Encounters:   07/25/23 49.00 kg/m²                                                                         Barriers to learning:  [x]None evident  []Acuity of illness  []Cognitive defects  []Cultural barriers  []Desire/Motivation  []Difficulty concentrating  []Emotional state  []Financial concerns  []Hearing deficit  []Language barrier  []Literacy  []Memory problems  []Vision impairment     Home caregiver present for session   []YES  [x] NO       Teaching methods:  []Demonstration  [x]Explanation  [x]Printed materials  []Teach back  []Virtual/web based         Verbalizes understanding Demonstrates  Needs further teaching Needs practice/ supervision Comments    Bariatric Surgery Diet  [] [] [] []    Clear liquid [] [] [] []    Full liquid [] [] [] []    Weight Reduction [x] [] [] []    Other Diet [] [] [] []          Additional Learner (s) Present                                                                  []Spouse   []Daughter    []  Son  []Family member   []Friend   []Grandfather   []Grandmother   []Father   []Mother   []Other       Expected Compliance:  [x]Good  []Fair  []Poor    Additional Information:      9 lbs lost in last month. Doing small portion of what family eats. Drinking water, unsweet tea, and flavor packets. Thinking about doing protein shakes for breakfast in the week instead of skipping     24 hr recall:  B skip most days: on weekends: [employer] scrambler 300 kcal (22 g protein, 22 g fat, 3 g carbs) or eggs, spinach, cheese  & 1/2 english muffin  2 pm: is usually when she starts eating: salad with meat   7 pm: meat with veg, small serving of starch from family    Plan:  Start including protein supplements in the diet plan  daily. Breakfast: have shake or snack combo.   Incorporate 4 oz protein source (approx. 20-30 g) at all meals. , Weigh all protein measurements on food scale  Eat every 4-6 hours (gave list of low sugar yogurts and snacks ideas)

## 2023-07-25 NOTE — PROGRESS NOTES
A nonsurgical medically supervised weight loss visit was conducted today.  Patient's weight is 215.3 lbs. She has lost 9 lbs. Since her visit last month. She has lost 17 lbs. Since March. She is currently taking Ozempic and states that it is helping her mindless eating. She is doing water aerobics 5x/week  and goes the gym at least 4x/month to do weights.            PLAN:  - Patient will continue with current exercise regimen.  - Patient will follow dietary advice from RD.    It is my professional opinion that patient is in the preparation stage of behavior change.    CASEY Gaspar, CPT, [facility]     Goes to gym at least 4x/week. Does water aerobics. Goal is 220.

## 2023-07-25 NOTE — PROGRESS NOTES
""want to lose weight"      Ms. Lo is a 58 y/o female pt that present to non surgical weight loss clinic for weight check and medication management. Pt has been on Ozempic 1mg injection for 1 week now and reports significant control of portion size and decreased appetite. Pt did have one day of nausea after eating pasta and cream sauce but got significantly better the next day. Pt is skipping meals now though so she needs to increase her protein intake. Great job with exercise and doing water aerobics 5-6 times a week. having some constipation but takes MiraLAX every now and then.      Previous weight loss medication: none, OTC things  Previous Diets: all  Current Medications:  amlodipine, aspirin, mobic  Medical History: HTN, OA, DM type 2  Surgical History: c/s, colonoscopy      Diet: skips meals now but did have increase in protein intake, less carbs and less eating out.   Exercise: water aerobics 5-6 days a week.      Labs (2/7/23): A1c: 6.5%, HDL: 27 (L), all other labs WNLs     Ht: 66in  Weights Consult 3/14/23: 320#          BMI: 51.65  Visit #1 4/24/23: 316#  Visit #2 5/22/23: 314#                         BMI: 50.68  Visit #3 6/13/23: 312#                         BMI: 50.39  Visit #4 7/25/23: 303#   BMI: 49.0       HTN- yes, amlodipine       HLD- no        DM-  yes, no medication        VALENTE- no        GERD- no   Anticoagulation- yes, ASA      PCP: Dr. Chau                Review of Systems   Constitutional: Negative.    Respiratory: Negative.    Cardiovascular: Negative.    Gastrointestinal: Negative.      Musculoskeletal: Negative.    Integumentary:  darkening around neck and under arms.   Neurological: Negative.    Psychiatric/Behavioral: Negative.           Objective:      Physical Exam  Vitals and nursing note reviewed.   Constitutional:       Appearance: Normal appearance.   Cardiovascular:      Rate and Rhythm: Normal rate and regular rhythm.      Pulses: Normal pulses.   Pulmonary:      " Effort: Pulmonary effort is normal.      Breath sounds: Normal breath sounds.   Abdominal:      General: Bowel sounds are normal.      Palpations: Abdomen is soft.      Tenderness: There is no abdominal tenderness.   Musculoskeletal:      Right lower leg: +1 pitting edema.      Left lower leg: +1 pitting edema   Skin:     General: Skin is warm and dry. Acanthosis nigricans around neck, under arms, mid thighs.   Neurological:      General: No focal deficit present.      Mental Status: She is alert and oriented to person, place, and time.   Psychiatric:         Mood and Affect: Mood normal.         Behavior: Behavior normal.         Judgment: Judgment normal.      Vitals:    07/25/23 1037   BP: 136/84   Pulse: 104           Assessment:       Problem List Items Addressed This Visit    Obesity      Visit Diagnoses          Obesity        Plan:       Plan:  Great job with change in dietary habits, limiting portion size, and watching carbs  Great job with aerobics exercise this month!   Discussed types of weight loss medications, risks/benefits of these medications.   Dr. Persaud to call in Ozempic 1mg injection, diagnosis diabetes.   Administer 1mg SQ qwk x's 4 weeks  Disp 1 box  Pt to call us if she would like to increase the dose to 2mg before she gets the refill of the medication if nausea has completely subsided. Again she needs to watch the high fatty foods, carbs, sugars. Pt verbalized understanding.   Discussed with patient the risks of dehydration, kidney injury with dehydration, pancreatitis, hypoglycemia, nausea, and thyroid tumor. Pt will self check thyroid as well as I in clinic monthly and will notify office if they feels any new nodules. Needs to remain very hydrated with 73- 100oz water daily. Decrease meal portion size to avoid nausea. Pt verbalized understanding  Discussed plan of care with medical bariatrician Dr. Sukhwinder Persaud and he was in agreement with proposed treatment plan.   Keep routine scheduled  appointments with PCP/specialists.   FU 1 month

## 2023-08-16 DIAGNOSIS — E11.9 TYPE 2 DIABETES MELLITUS WITHOUT COMPLICATION, WITHOUT LONG-TERM CURRENT USE OF INSULIN: Primary | ICD-10-CM

## 2023-08-16 RX ORDER — SEMAGLUTIDE 1.34 MG/ML
1 INJECTION, SOLUTION SUBCUTANEOUS
Qty: 3 ML | Refills: 0 | Status: SHIPPED | OUTPATIENT
Start: 2023-08-16 | End: 2023-09-07

## 2023-08-16 NOTE — TELEPHONE ENCOUNTER
Teresita Brian called requesting refill of Ozempic.  She has a nurse accountability visit scheduled for Friday but she needs an injection for Thursday.

## 2023-08-18 ENCOUNTER — CLINICAL SUPPORT (OUTPATIENT)
Dept: BARIATRICS | Facility: HOSPITAL | Age: 58
End: 2023-08-18
Payer: COMMERCIAL

## 2023-08-18 VITALS
BODY MASS INDEX: 47.09 KG/M2 | DIASTOLIC BLOOD PRESSURE: 87 MMHG | SYSTOLIC BLOOD PRESSURE: 136 MMHG | HEIGHT: 66 IN | WEIGHT: 293 LBS | HEART RATE: 94 BPM

## 2023-08-18 DIAGNOSIS — E66.01 SEVERE OBESITY (BMI >= 40): Primary | ICD-10-CM

## 2023-08-18 PROCEDURE — 94200024 HC BARIATRIC CONSULT - 30 MINS

## 2023-08-18 NOTE — PROGRESS NOTES
"  Teresita Brian presents today for accountability nurse visit.    Subjective:  Patient states she had a difficult month.  She states she does not feel like she is making progress with her weight loss.  Patient made appointment with counselor.     Objective:    Blood pressure 136/87, pulse 94, height 5' 6" (1.676 m), weight (!) 136.5 kg (301 lb).    Weight History:  3/14/23 Wt 320.0 BF 55.3 BMI 51.6  4/24/23 Wt 315.8ln, BF 56.9%, BMI 51.0  5/22/23 Wt 314.0 BF 52.3% BMI 50.7  6/13/23 Wt 312.0 BF 55.4%, BMI 50.4  7/25/23 Wt 303.6 BF 52.7% BMI 49.0  8/18/23 Wt 301.0 BF 51.8% BMI 48.6    Medication:  Does not need refill of Ozempic at this time.  Patient states she is having constipation and is taking Miralax as suggested by YAJAIRA Higgins NP at last visit and is having some improvement.  Educated patient on Miralax and encouraged her to increase her water intake to 73-100oz per day.     Behavior Modification Plan:   Plan and Goals from last visit with Behavior Modification Educator Reviewed.  She is doing water aerobics 5x/week  and goes the gym at least 4x/month to do weights.    Her plan is to start increase weights to twice a week next month.    Nutrition Plan:  Patient states she is following the nutrition plan as advised by Registered Dietician.   Plan at last visit:    Start including protein supplements in the diet plan daily. Breakfast: have shake or snack combo.   Incorporate 4 oz protein source (approx. 20-30 g) at all meals. , Weigh all protein measurements on food scale  Eat every 4-6 hours (gave list of low sugar yogurts and snacks ideas)    Patient sates she is drinking a shake for breakfast with fruit every day.  Patient states she is struggling with lunch choices.  Patient will plan and prepare lunch ahead of time.  Encouraged patient to measure food.     Encouraged patient that she was doing very well and that I am proud of the progress she is making with her exercise and dietary " choices!      Follow-up accountability nurse visit in 4 weeks, Follow-up with Dr. Hung RD and behavior mod in 4 weeks.    Patricia Bhatt LPN

## 2023-09-07 DIAGNOSIS — E11.9 TYPE 2 DIABETES MELLITUS WITHOUT COMPLICATION, WITHOUT LONG-TERM CURRENT USE OF INSULIN: ICD-10-CM

## 2023-09-08 RX ORDER — SEMAGLUTIDE 1.34 MG/ML
2 INJECTION, SOLUTION SUBCUTANEOUS
Qty: 6 ML | Refills: 0 | Status: SHIPPED | OUTPATIENT
Start: 2023-09-08 | End: 2023-09-15

## 2023-09-12 NOTE — PROGRESS NOTES
"Teresita Brian presents today for accountability nurse visit.    Blood pressure (!) 152/82, pulse 96, height 5' 6" (1.676 m), weight 134.3 kg (296 lb).    Weight History:  3/14/23 Wt 320.0 BF 55.3 BMI 51.6  4/24/23 Wt 315.8ln, BF 56.9%, BMI 51.0  5/22/23 Wt 314.0 BF 52.3% BMI 50.7  6/13/23 Wt 312.0 BF 55.4%, BMI 50.4  7/25/23 Wt 303.6 BF 52.7% BMI 49.0  8/18/23 Wt 301.0 BF 51.8% BMI 48.6    Medication:  Desires refill of Ozempic.  Patient would like to increase dosage to 2mg. Refill request will be sent to Dr. Persaud.  Advised patient to continue to eat on a schedule if she has decreased appetite with increasing dosage of Ozempic. Educational material on medication precautions and side effects reviewed.   Behavior Modification Plan:   Plan at last visit with Behavior Modification Educator:  She is doing water aerobics 5x/week  and goes the gym at least 4x/month to do weights.    Her plan is to start increase weights to twice a week next month.     Patient has increased her weight lifting to 2 times per week!  Patient has increased activity and movement throughout her day.   Plan:   Continue current exercise and activity.     Nutrition Plan:  Plan at last visit: Patient sates she is drinking a shake for breakfast with fruit every day.  Patient states she is struggling with lunch choices.  Patient will plan and prepare lunch ahead of time.  Encouraged patient to measure food.     Patient is preparing her lunch from leftovers from the day before or a salad the day before. She is also measuring her portions.   24H Recall  B:  Shake and string cheese  L:  Cheeseburger salad  D: Grilled chicken breast and air fried cauliflower and broccoli  S:  grapes    Plan:  Continue current plan.  Patient will follow dietary advice from Janeen Lawson RD.    Follow-up in 4 weeks with Provider and Bariatric Team.    Patricia Bhatt LPN      "

## 2023-09-15 ENCOUNTER — CLINICAL SUPPORT (OUTPATIENT)
Dept: BARIATRICS | Facility: HOSPITAL | Age: 58
End: 2023-09-15

## 2023-09-15 VITALS
DIASTOLIC BLOOD PRESSURE: 82 MMHG | HEART RATE: 96 BPM | BODY MASS INDEX: 47.09 KG/M2 | HEIGHT: 66 IN | WEIGHT: 293 LBS | SYSTOLIC BLOOD PRESSURE: 152 MMHG

## 2023-09-15 DIAGNOSIS — E11.9 TYPE 2 DIABETES MELLITUS WITHOUT COMPLICATION, WITHOUT LONG-TERM CURRENT USE OF INSULIN: ICD-10-CM

## 2023-09-15 DIAGNOSIS — E66.01 MORBID OBESITY: Primary | ICD-10-CM

## 2023-09-15 PROCEDURE — 94200024 HC BARIATRIC CONSULT - 30 MINS

## 2023-09-15 RX ORDER — SEMAGLUTIDE 2.68 MG/ML
2 INJECTION, SOLUTION SUBCUTANEOUS
Qty: 3 ML | Refills: 0 | Status: SHIPPED | OUTPATIENT
Start: 2023-09-15 | End: 2023-10-13

## 2023-09-15 RX ORDER — SEMAGLUTIDE 2.68 MG/ML
INJECTION, SOLUTION SUBCUTANEOUS
Status: CANCELLED | OUTPATIENT
Start: 2023-09-15 | End: 2024-09-14

## 2023-09-15 NOTE — TELEPHONE ENCOUNTER
Teresita Brian seen in clinic today for Nurse Accountability Visit.  Desires refill of medication.

## 2023-10-02 ENCOUNTER — CLINICAL SUPPORT (OUTPATIENT)
Dept: BARIATRICS | Facility: HOSPITAL | Age: 58
End: 2023-10-02
Payer: COMMERCIAL

## 2023-10-02 VITALS — BODY MASS INDEX: 47.18 KG/M2 | WEIGHT: 292.31 LBS

## 2023-10-02 DIAGNOSIS — E66.9 OBESITY: Primary | ICD-10-CM

## 2023-10-02 DIAGNOSIS — E66.9 OBESITY, UNSPECIFIED CLASSIFICATION, UNSPECIFIED OBESITY TYPE, UNSPECIFIED WHETHER SERIOUS COMORBIDITY PRESENT: Primary | ICD-10-CM

## 2023-10-02 NOTE — PROGRESS NOTES
NSWL 5/6   Current wt 292lb -4lb wt loss         Pt has lost 30lb since march, she feels good and has more energy to do the things that she loves. She is eatchign what she is eating and has started to eat breakfast every day. Pt still going long hours with out eating sometimes so she will work on adding in a snack to also help increase protein and calorie intake   Drinking 8 bottles of water     B: protein shake   L: protein and cheese   D:  grilled meat and 1/2 cup moustapha rice and vegetables         Plan:   Add snack between lunch and dinner   Limit starch at 1/3 cup   Continue current water intake

## 2023-10-02 NOTE — PROGRESS NOTES
A nonsurgical medically supervised weight loss visit was conducted today.  Patient's weight is 292.3 lbs. She has lost 4 lbs. Since her visit last month. Since March, patient has lost 28 lbs. Her goal is to weigh 220 lbs. By April. She is exercising at least 4x/week by doing cardio and weights. She continues to take Ozempic.  Patient states she is not skipping meals. She is eating protein with every meal. She typical eats three meals a day and one snack a day.       A body composition was conducted.    PLAN:  - Patient will continue with current exercise regimen.  - Patient will follow dietary advice from Janeen Lawson RD.    It is my professional opinion that patient is in the action stage of behavior change.    CASEY Gaspar, CPT, [facility]

## 2023-10-04 ENCOUNTER — OFFICE VISIT (OUTPATIENT)
Dept: SURGERY | Facility: CLINIC | Age: 58
End: 2023-10-04

## 2023-10-04 VITALS
SYSTOLIC BLOOD PRESSURE: 133 MMHG | DIASTOLIC BLOOD PRESSURE: 96 MMHG | BODY MASS INDEX: 46.98 KG/M2 | HEART RATE: 112 BPM | WEIGHT: 292.31 LBS | HEIGHT: 66 IN

## 2023-10-04 DIAGNOSIS — E11.9 TYPE 2 DIABETES MELLITUS WITHOUT COMPLICATION, WITHOUT LONG-TERM CURRENT USE OF INSULIN: ICD-10-CM

## 2023-10-04 DIAGNOSIS — E66.01 CLASS 3 SEVERE OBESITY WITH BODY MASS INDEX (BMI) OF 45.0 TO 49.9 IN ADULT, UNSPECIFIED OBESITY TYPE, UNSPECIFIED WHETHER SERIOUS COMORBIDITY PRESENT: Primary | ICD-10-CM

## 2023-10-04 PROCEDURE — 99213 OFFICE O/P EST LOW 20 MIN: CPT | Mod: ,,, | Performed by: FAMILY MEDICINE

## 2023-10-04 PROCEDURE — 99213 PR OFFICE/OUTPT VISIT, EST, LEVL III, 20-29 MIN: ICD-10-PCS | Mod: ,,, | Performed by: FAMILY MEDICINE

## 2023-10-04 RX ORDER — SEMAGLUTIDE 2.68 MG/ML
2 INJECTION, SOLUTION SUBCUTANEOUS
Qty: 3 ML | Refills: 11 | Status: SHIPPED | OUTPATIENT
Start: 2023-10-04 | End: 2023-11-03 | Stop reason: SDUPTHER

## 2023-10-04 NOTE — PROGRESS NOTES
Patient ID: Teresita Brian is a 58 y.o. female.  Chief Complaint: Obesity (Nonsurgical weight loss follow-up/)    HPI:   Patient presents here today for above reason.     Steadily  wt loss on Ozempic 2mg.  No side effects.  Exercising 4 days per week, 2cardio and 2 weights.    Past Medical History:  Past Medical History:   Diagnosis Date    Arthritis     Essential (primary) hypertension     Morbid obesity     Osteoarthritis      Past Surgical History:   Procedure Laterality Date     SECTION      COLONOSCOPY W/ POLYPECTOMY  10/01/2018     Review of patient's allergies indicates:   Allergen Reactions    Ace inhibitors Swelling     Current Outpatient Medications on File Prior to Visit   Medication Sig Dispense Refill    amlodipine-valsartan (EXFORGE) 5-160 mg per tablet Take 1 tablet by mouth every morning.      aspirin (ECOTRIN) 81 MG EC tablet Take 81 mg by mouth once daily.      semaglutide (OZEMPIC) 2 mg/dose (8 mg/3 mL) PnIj Inject 2 mg into the skin every 7 days. 3 mL 0    amLODIPine (NORVASC) 5 MG tablet TAKE ONE TABLET BY MOUTH DAILY 90 tablet 4    meloxicam (MOBIC) 15 MG tablet Take 1 tablet (15 mg total) by mouth once daily. (Patient not taking: Reported on 10/4/2023) 30 tablet 2     No current facility-administered medications on file prior to visit.     Social History     Socioeconomic History    Marital status:    Tobacco Use    Smoking status: Never    Smokeless tobacco: Never   Substance and Sexual Activity    Alcohol use: Not Currently    Drug use: Never    Sexual activity: Yes     Partners: Male     Family History   Problem Relation Age of Onset    Diabetes Mother     Diabetes Sister     Diabetes Brother      ROS:   Review of Systems  Respiratory: No shortness of breath, No cough, No wheezing.   Cardiovascular: No chest pain, No palpitations, No peripheral edema.   Gastrointestinal: No nausea, No vomiting, No diarrhea, No constipation, No abdominal pain.   Genitourinary: No dysuria,  "No hematuria.   Hematology/Lymphatics: No bruising tendency, No bleeding tendency, No swollen lymph glands.   Endocrine: No excessive thirst, No polyuria, No excessive hunger.   Neurologic: No abnormal balance, No confusion, No headache.   Psychiatric: No anxiety, No depression, Not suicidal, No hallucinations.    Vitals/PE:   BP (!) 133/96   Pulse (!) 112   Ht 5' 6" (1.676 m)   Wt 132.6 kg (292 lb 4.8 oz)   BMI 47.18 kg/m²   Physical Exam  General: Alert and oriented, No acute distress.   Respiratory: Lungs CTAB, Respirations are non-labored, Breath sounds are equal, Symmetrical chest wall expansion.  Cardiovascular: Normal rate, Regular rhythm, No murmur, No edema.   Gastrointestinal: Non-distended.   Genitourinary: Deferred.  Musculoskeletal: Normal ROM, Normal gait, No deformities or amputations.  Integumentary: Warm, Dry, Intact. No diaphoresis, or flushing.  Neurologic: No focal deficits, Cranial Nerves II-XII are grossly intact.   Psychiatric: Cooperative, Appropriate mood & affect, Normal judgment, Non-suicidal.  Assessment/Plan:   1. Class 3 severe obesity with body mass index (BMI) of 45.0 to 49.9 in adult, unspecified obesity type, unspecified whether serious comorbidity present  - semaglutide (OZEMPIC) 2 mg/dose (8 mg/3 mL) PnIj; Inject 2 mg into the skin every 7 days.  Dispense: 3 mL; Refill: 11    2. Type 2 diabetes mellitus without complication, without long-term current use of insulin  - semaglutide (OZEMPIC) 2 mg/dose (8 mg/3 mL) PnIj; Inject 2 mg into the skin every 7 days.  Dispense: 3 mL; Refill: 11      F/U Nurse Visit 1 month            "

## 2023-11-03 ENCOUNTER — CLINICAL SUPPORT (OUTPATIENT)
Dept: BARIATRICS | Facility: HOSPITAL | Age: 58
End: 2023-11-03
Payer: COMMERCIAL

## 2023-11-03 VITALS
HEIGHT: 66 IN | BODY MASS INDEX: 45.88 KG/M2 | DIASTOLIC BLOOD PRESSURE: 82 MMHG | HEART RATE: 80 BPM | SYSTOLIC BLOOD PRESSURE: 126 MMHG | WEIGHT: 285.5 LBS

## 2023-11-03 DIAGNOSIS — E66.01 MORBID OBESITY: Primary | ICD-10-CM

## 2023-11-03 DIAGNOSIS — E66.01 CLASS 3 SEVERE OBESITY WITH BODY MASS INDEX (BMI) OF 45.0 TO 49.9 IN ADULT, UNSPECIFIED OBESITY TYPE, UNSPECIFIED WHETHER SERIOUS COMORBIDITY PRESENT: ICD-10-CM

## 2023-11-03 DIAGNOSIS — E11.9 TYPE 2 DIABETES MELLITUS WITHOUT COMPLICATION, WITHOUT LONG-TERM CURRENT USE OF INSULIN: ICD-10-CM

## 2023-11-03 PROCEDURE — 94200024 HC BARIATRIC CONSULT - 30 MINS

## 2023-11-03 NOTE — PROGRESS NOTES
"Teresita Brian presents today for accountability nurse visit.    Blood pressure 126/82, pulse 80, height 5' 6" (1.676 m), weight 129.5 kg (285 lb 8 oz).    Weight History:  3/14/23 Wt 320.0 BF 55.3 BMI 51.6  4/24/23 Wt 315.8ln, BF 56.9%, BMI 51.0  5/22/23 Wt 314.0 BF 52.3% BMI 50.7  6/13/23 Wt 312.0 BF 55.4%, BMI 50.4  7/25/23 Wt 303.6 BF 52.7% BMI 49.0  8/18/23 Wt 301.0 BF 51.8% BMI 48.6  9/15/23 Wt 296.0 BF 52.2%, BMI 47.8  10/4/23 Wt 292.3, BF 48.8%, BMI 47.2  Medication:  Desires refill of Ozempic.  Refill request will be sent to Dr. Persaud.  Educational material on medication precautions and side effects reviewed.     Behavior Modification Plan at last visit: Continue current plan.  Patient is going to the gym 4 days per week.  She is riding bike for 20 min and lifting weights for 20-30min.  Plan:   Patient will increase cardio to 25 min per session.  Patient is interested in adding stair master to workout plan.     Nutrition   Dietary Plan at last visit:  Add snack between lunch and dinner, Limit starch at 1/3 cup, Continue current water intake  24H Recall  B:  scrambled eggs and sausage gagan  L:  grilled chicken salad  D:  gumbo with cauliflower rice  S:  Nuts or shake after workout    Plan:  Continue current meal plan  Patient will follow dietary advice from Janeen Lawson RD.    Follow-up visit in 4 weeks with Nurse for Accountability Visit, Provider and Bariatric Team in 8 weeks    Patricia Bhatt LPN      "

## 2023-11-06 RX ORDER — SEMAGLUTIDE 2.68 MG/ML
2 INJECTION, SOLUTION SUBCUTANEOUS
Qty: 3 ML | Refills: 11 | Status: SHIPPED | OUTPATIENT
Start: 2023-11-06 | End: 2024-01-09 | Stop reason: SDUPTHER

## 2023-12-01 ENCOUNTER — CLINICAL SUPPORT (OUTPATIENT)
Dept: BARIATRICS | Facility: HOSPITAL | Age: 58
End: 2023-12-01

## 2023-12-01 VITALS
SYSTOLIC BLOOD PRESSURE: 122 MMHG | HEIGHT: 66 IN | HEART RATE: 84 BPM | WEIGHT: 284 LBS | DIASTOLIC BLOOD PRESSURE: 84 MMHG | BODY MASS INDEX: 45.64 KG/M2

## 2023-12-01 DIAGNOSIS — E66.01 MORBID OBESITY: Primary | ICD-10-CM

## 2023-12-01 PROCEDURE — 94200024 HC BARIATRIC CONSULT - 30 MINS

## 2023-12-01 NOTE — PROGRESS NOTES
"Teresita Brian presents today for accountability nurse visit.    Blood pressure 122/84, pulse 84, height 5' 6" (1.676 m), weight 128.8 kg (284 lb).    Weight History:  3/14/23 Wt 320.0 BF 55.3 BMI 51.6  4/24/23 Wt 315.8ln, BF 56.9%, BMI 51.0  5/22/23 Wt 314.0 BF 52.3% BMI 50.7  6/13/23 Wt 312.0 BF 55.4%, BMI 50.4  7/25/23 Wt 303.6 BF 52.7% BMI 49.0  8/18/23 Wt 301.0 BF 51.8% BMI 48.6  9/15/23 Wt 296.0 BF 52.2%, BMI 47.8  10/4/23 Wt 292.3, BF 48.8%, BMI 47.2  11/3/2023 Wt 285.5, BF 53.2%, BMI 46.1     Medication:  Desires refill of Ozempic.  Refill request will be sent to Dr. Persaud.  Educational material on medication precautions and side effects reviewed.     Behavior Modification Plan at last visit:   Patient will increase cardio to 25 min per session.  Patient is interested in adding stair master to workout plan.   Going to gym 5 days per week. Cardio 25 min per session and 30 min weight training.   States she is having increased cravings at this time, possibly due to increased stress.  Encouraged patient to be mindful of the times she is having cravings to assess how she is feeling at that time.   Plan:   Increase cardio to 30 min 5 days per week.     Nutrition   Dietary Plan at last visit:  24H Recall  B:  protein shake   L:  lettuce wrap from [employer]  D:  meatball stew with salad  S:  none    Plan:  Add snack before workout.  Patient will follow dietary advice from Janeen Lawson RD.    Follow-up visit in 4 weeks with Nurse for Accountability Visit, Provider and Bariatric Team in 8 weeks    Patricia Bhatt LPN        "

## 2023-12-26 DIAGNOSIS — M17.11 PRIMARY OSTEOARTHRITIS OF RIGHT KNEE: ICD-10-CM

## 2023-12-26 RX ORDER — MELOXICAM 15 MG/1
15 TABLET ORAL DAILY
Qty: 30 TABLET | Refills: 2 | Status: SHIPPED | OUTPATIENT
Start: 2023-12-26

## 2024-01-08 NOTE — PROGRESS NOTES
"Teresita Brian    "want to continue to lose weight"      Ms. Lo is a 56 y/o female pt that present to non surgical weight loss clinic for weight check and medication management. Pt has been on Ozempic 2mg injection since March 2023. No side effects or problems with the medication. Still controlling cravings well, portion size as well. Pt notes that she did have a fall 12/15 and hurt her right knee that she needs a replacement on, so has been out of the gym. Plans on getting back into it this week and also increasing chair exercises. Pt will have annual appt with PCP in March with wellness labs. Pt will be seeing Dr. Jiménez in the next two weeks to discuss her total knee replacement.      Previous weight loss medication: none, OTC things  Previous Diets: all  Current Medications: amlodipine, aspirin, mobic  Medical History: HTN, OA, DM type 2  Surgical History: c/s, colonoscopy      Diet: doing well with plan.   Exercise: no gym at this time due to fall in mid December      Labs (2/7/23): A1c: 6.5%, HDL: 27 (L), all other labs WNLs     Ht: 66in  Weights Consult 3/14/23: 320#          BMI: 51.65  Visit #1 4/24/23: 316#  Visit #2 5/22/23: 314#                         BMI: 50.68  Visit #3 6/13/23: 312#                         BMI: 50.39  Visit #4 7/25/23: 303#                         BMI: 49.0  11/3/23: 285#                                      BMI: 46  1/9/23: 284#                                        BMI: 45       HTN- yes, amlodipine       HLD- no        DM-  yes, no medication        VALENTE- no        GERD- no   Anticoagulation- yes, ASA      PCP: Dr. Chau           Subjective:     See HPI for details    Constitutional: Denies Change in appetite. Denies Chills. Denies Fever. Denies Night sweats.  Respiratory: Denies Cough. Denies Shortness of breath. Denies Shortness of breath with exertion. Denies Wheezing.  Cardiovascular: Denies Chest pain at rest. Denies Chest pain with exertion. Denies Irregular " "heartbeat. Denies Palpitations. Denies Edema.  Gastrointestinal: Denies Abdominal pain. DeniesDiarrhea. Denies Nausea. Denies Vomiting. Denies Hematemesis or Hematochezia.  Genitourinary: Denies Dysuria. Denies Urinary frequency. Denies Urinary urgency. Denies Blood in urine.  Endocrine: Denies Cold intolerance. Denies Excessive thirst. Denies Heat intolerance. Denies Weight loss.   Musculoskeletal: + Painful joints, chronic. Denies Weakness.  Integumentary: Denies Rash. Denies Itching. Denies Dry skin.  Neurologic: Denies Dizziness. Denies Fainting. Denies Headache.  Psychiatric: Denies Depression. Denies Anxiety. Denies Suicidal/Homicidal ideations.    12 point review of systems conducted, negative except as stated in the history of present illness. See HPI for details.    Review of patient's allergies indicates:   Allergen Reactions    Ace inhibitors Swelling     Past Medical History:   Diagnosis Date    Arthritis     Essential (primary) hypertension     Morbid obesity     Osteoarthritis      Past Surgical History:   Procedure Laterality Date     SECTION      COLONOSCOPY W/ POLYPECTOMY  10/01/2018     Family History   Problem Relation Age of Onset    Diabetes Mother     Diabetes Sister     Diabetes Brother      Social History     Tobacco Use    Smoking status: Never    Smokeless tobacco: Never   Substance Use Topics    Alcohol use: Not Currently    Drug use: Never      Current Outpatient Medications   Medication Instructions    amlodipine-valsartan (EXFORGE) 5-160 mg per tablet 1 tablet, Oral, Every morning    aspirin (ECOTRIN) 81 mg, Oral, Daily    meloxicam (MOBIC) 15 mg, Oral, Daily    OZEMPIC 2 mg, Subcutaneous, Every 7 days       Objective:     Vital Signs (Most Recent):  Visit Vitals  /89   Pulse 86   Ht 5' 6" (1.676 m)   Wt 128.9 kg (284 lb 3.2 oz)   BMI 45.87 kg/m²       Physical Exam:  General:  Alert and oriented.    Respiratory:  Lungs are clear to auscultation, Respirations are " non-labored, Breath sounds are equal.    Cardiovascular:  Normal rate, Regular rhythm, No murmur.    Gastrointestinal:  Soft, Non-tender, Non-distended, Normal bowel sounds        Musculoskeletal:  Normal range of motion, Normal strength.    Integumentary:  Warm, Dry, Pink.    Neurologic:  Alert, Oriented.    Psychiatric:  Cooperative.      Assessment:         ICD-10-CM ICD-9-CM   1. Encounter for weight loss counseling  Z71.3 V65.3   2. Morbid obesity  E66.01 278.01        Plan:     1. Encounter for weight loss counseling    2. Morbid obesity           Plan:  Discussed importance of strict dietary compliance as recommended by dietitian. Limit processed foods and starchy CHOs. Eliminate high calorie liquids. Increased water intake. Practice mindful eating patterns. Recommend food journaling for accountability.  Continue to monitor diet closely and watch carbs due to knee pain  Begin chair and seated exercises since unable to get to the gym due to knee issues at this time  F/u with Ortho to discuss knee replacement  Continue Ozempyumiko Persaud to fill Ozempic 2mg injection  Administer 2mg injection SQ qwk x's 4 weeks  Disp 1 pen  NR     Discussed with patient the risks of   Muscle wasting  Exercise plan discussed with patient. Need weight bearing joint activity to keep lean muscle mass.   Dehydration  Needs to remain very hydrated with 73- 100oz water daily.  Possible headache on injection day, but again stay very hydrated.   Kidney injury due to dehydration  Pancreatitis  Hypoglycemia or low blood sugar episode  Nausea  Decrease meal portion size to avoid nausea.   Avoid fried/fatty meals  Avoid meals high in sugar or sugary drinks  Thyroid tumor  Pt will self check thyroid as well as I in clinic monthly and will notify office if they feels any new nodules.   Pt verbalized understanding  Discussed plan of care with medical bariatrician Dr. Sukhwinder Persaud and he was in agreement with proposed treatment plan.   Keep  routine scheduled appointments with PCP/specialists.   FU 1 month

## 2024-01-09 ENCOUNTER — CLINICAL SUPPORT (OUTPATIENT)
Dept: BARIATRICS | Facility: HOSPITAL | Age: 59
End: 2024-01-09

## 2024-01-09 ENCOUNTER — OFFICE VISIT (OUTPATIENT)
Dept: SURGERY | Facility: CLINIC | Age: 59
End: 2024-01-09
Payer: COMMERCIAL

## 2024-01-09 VITALS
SYSTOLIC BLOOD PRESSURE: 129 MMHG | BODY MASS INDEX: 45.67 KG/M2 | HEART RATE: 86 BPM | WEIGHT: 284.19 LBS | DIASTOLIC BLOOD PRESSURE: 89 MMHG | HEIGHT: 66 IN

## 2024-01-09 VITALS — BODY MASS INDEX: 45.66 KG/M2 | WEIGHT: 282.88 LBS

## 2024-01-09 DIAGNOSIS — Z71.3 ENCOUNTER FOR WEIGHT LOSS COUNSELING: Primary | ICD-10-CM

## 2024-01-09 DIAGNOSIS — E11.9 TYPE 2 DIABETES MELLITUS WITHOUT COMPLICATION, WITHOUT LONG-TERM CURRENT USE OF INSULIN: ICD-10-CM

## 2024-01-09 DIAGNOSIS — E66.01 CLASS 3 SEVERE OBESITY WITH BODY MASS INDEX (BMI) OF 45.0 TO 49.9 IN ADULT, UNSPECIFIED OBESITY TYPE, UNSPECIFIED WHETHER SERIOUS COMORBIDITY PRESENT: ICD-10-CM

## 2024-01-09 DIAGNOSIS — E66.01 MORBID OBESITY: ICD-10-CM

## 2024-01-09 DIAGNOSIS — E66.9 OBESITY, UNSPECIFIED CLASSIFICATION, UNSPECIFIED OBESITY TYPE, UNSPECIFIED WHETHER SERIOUS COMORBIDITY PRESENT: Primary | ICD-10-CM

## 2024-01-09 DIAGNOSIS — E66.9 OBESITY: Primary | ICD-10-CM

## 2024-01-09 PROCEDURE — 3008F BODY MASS INDEX DOCD: CPT | Mod: CPTII,,, | Performed by: NURSE PRACTITIONER

## 2024-01-09 PROCEDURE — 94200023 HC BARIATRIC CONSULT - 60 MINS

## 2024-01-09 PROCEDURE — 3079F DIAST BP 80-89 MM HG: CPT | Mod: CPTII,,, | Performed by: NURSE PRACTITIONER

## 2024-01-09 PROCEDURE — 99213 OFFICE O/P EST LOW 20 MIN: CPT | Mod: ,,, | Performed by: NURSE PRACTITIONER

## 2024-01-09 PROCEDURE — 3074F SYST BP LT 130 MM HG: CPT | Mod: CPTII,,, | Performed by: NURSE PRACTITIONER

## 2024-01-09 NOTE — PROGRESS NOTES
[facility] F/U     Current wt: 282#     Pt weight a 2# weight loss. She injured herself this past month so she was unable to exercise  and it was the holidays. But she feels that she was able to limit her holiday eating to small portions and still enjoy her food.   She has limited starches and ensured that she is eating frequently to maintain protein.   Referred to [employer] to help her come up with other ideas for food joie annette so she does not get bored. Recommenced that she chose different protein  and different flavor palates to change it up.     24:   Protein shake \  Salad   Meat and cheese   Boiled egg   Meat and vegetables ( sometimes small starch)   Water     Plan:   Continue current meal plan   Try out different recipes from [employer]

## 2024-01-09 NOTE — PROGRESS NOTES
A nonsurgical medically supervised weight loss visit was conducted today.  Patient's weight is 282 lbs. She has lost 2 lbs. Since her last visit . She continues to take Ozempic. She had a recent fall where she injured her knee and has not been able to exercise. However, she does plan to start back at her gym 3x/week with hopes of 5x/week. She does cardio and weights at the gym.        PLAN:  - Patient will continue with current exercise regimen.  - Patient will follow dietary advice from Janeen Lawson RD.    It is my professional opinion that patient is in the preparation stage of behavior change.    CASEY Gaspar, CPT, CHC

## 2024-01-11 RX ORDER — SEMAGLUTIDE 2.68 MG/ML
2 INJECTION, SOLUTION SUBCUTANEOUS
Qty: 3 ML | Refills: 0 | Status: SHIPPED | OUTPATIENT
Start: 2024-01-11 | End: 2024-02-10

## 2024-02-16 ENCOUNTER — CLINICAL SUPPORT (OUTPATIENT)
Dept: BARIATRICS | Facility: HOSPITAL | Age: 59
End: 2024-02-16
Payer: COMMERCIAL

## 2024-02-16 VITALS
WEIGHT: 281 LBS | DIASTOLIC BLOOD PRESSURE: 90 MMHG | HEART RATE: 84 BPM | BODY MASS INDEX: 45.16 KG/M2 | HEIGHT: 66 IN | SYSTOLIC BLOOD PRESSURE: 138 MMHG

## 2024-02-16 DIAGNOSIS — E66.01 MORBID OBESITY: Primary | ICD-10-CM

## 2024-02-16 PROCEDURE — 94200024 HC BARIATRIC CONSULT - 30 MINS

## 2024-02-16 RX ORDER — SEMAGLUTIDE 2.68 MG/ML
2 INJECTION, SOLUTION SUBCUTANEOUS WEEKLY
COMMUNITY
Start: 2023-10-13 | End: 2024-03-15 | Stop reason: SDUPTHER

## 2024-02-16 NOTE — PROGRESS NOTES
"Teresita Brian presents today for accountability nurse visit.    Blood pressure (!) 138/90, pulse 84, height 5' 6" (1.676 m), weight 127.5 kg (281 lb).  Patient has apt with PCP on 2/29/24 to recheck BP, it was ruby at visit with PCP this week.     Weight History:  3/14/23 Wt 320.0 BF 55.3 BMI 51.6  4/24/23 Wt 315.8ln, BF 56.9%, BMI 51.0  5/22/23 Wt 314.0 BF 52.3% BMI 50.7  6/13/23 Wt 312.0 BF 55.4%, BMI 50.4  7/25/23 Wt 303.6 BF 52.7% BMI 49.0  8/18/23 Wt 301.0 BF 51.8% BMI 48.6  9/15/23 Wt 296.0 BF 52.2%, BMI 47.8  10/4/23 Wt 292.3, BF 48.8%, BMI 47.2  11/3/2023 Wt 285.5, BF 53.2%, BMI 46.1  12/1/2023 Wt 284.0, BF 51.2%, BMI 45.8  1/9/2024 Wt 284.2, BF 53.5%, BMI 45.9     Medication:  On Ozempic 2mg. Tolerating medication well, denies any side effects. Does not need refill at this time.       Behavior Modification Plan at last visit: Patient will continue with current exercise regimen.     Patient is having right knee pain. She is walking 45 min on the treadmill with out pain 4 days per week, upper body strength training weights 4 times per week.  Patient is doing chair exercised twice a day per the orthopedic to strengthen knee.      Plan:   Continue current exercise routine as tolerated.     Nutrition   Dietary Plan at last visit:  24H Recall  B:  2 boiled eggs and apple  L:  grilled chicken salad  D:  boiled sausage, potatoes and turkey neck    S:  none    Plan:    Watch sodium intake   Patient will follow dietary advice from Janeen Lawson RD.    Follow-up visit in 4 weeks with Nurse for Accountability Visit, Provider and Bariatric Team in 8 weeks    Patricia Bhatt LPN      "

## 2024-03-04 ENCOUNTER — LAB VISIT (OUTPATIENT)
Dept: LAB | Facility: HOSPITAL | Age: 59
End: 2024-03-04
Attending: NURSE PRACTITIONER
Payer: COMMERCIAL

## 2024-03-04 DIAGNOSIS — E55.9 VITAMIN D DEFICIENCY: Primary | ICD-10-CM

## 2024-03-04 DIAGNOSIS — I15.2 HYPERTENSION ASSOCIATED WITH DIABETES: ICD-10-CM

## 2024-03-04 DIAGNOSIS — E11.59 HYPERTENSION ASSOCIATED WITH DIABETES: ICD-10-CM

## 2024-03-04 DIAGNOSIS — Z00.00 WELL ADULT EXAM: ICD-10-CM

## 2024-03-04 DIAGNOSIS — Z13.29 SCREENING FOR THYROID DISORDER: ICD-10-CM

## 2024-03-04 LAB
ALBUMIN SERPL-MCNC: 3.3 G/DL (ref 3.5–5)
ALBUMIN/GLOB SERPL: 0.9 RATIO (ref 1.1–2)
ALP SERPL-CCNC: 99 UNIT/L (ref 40–150)
ALT SERPL-CCNC: 9 UNIT/L (ref 0–55)
AST SERPL-CCNC: 11 UNIT/L (ref 5–34)
BASOPHILS # BLD AUTO: 0.06 X10(3)/MCL
BASOPHILS NFR BLD AUTO: 0.8 %
BILIRUB SERPL-MCNC: 0.2 MG/DL
BUN SERPL-MCNC: 18.1 MG/DL (ref 9.8–20.1)
CALCIUM SERPL-MCNC: 9.3 MG/DL (ref 8.4–10.2)
CHLORIDE SERPL-SCNC: 108 MMOL/L (ref 98–107)
CHOLEST SERPL-MCNC: 107 MG/DL
CHOLEST/HDLC SERPL: 3 {RATIO} (ref 0–5)
CO2 SERPL-SCNC: 28 MMOL/L (ref 22–29)
CREAT SERPL-MCNC: 0.73 MG/DL (ref 0.55–1.02)
DEPRECATED CALCIDIOL+CALCIFEROL SERPL-MC: 33.7 NG/ML (ref 30–80)
EOSINOPHIL # BLD AUTO: 0.33 X10(3)/MCL (ref 0–0.9)
EOSINOPHIL NFR BLD AUTO: 4.3 %
ERYTHROCYTE [DISTWIDTH] IN BLOOD BY AUTOMATED COUNT: 15.6 % (ref 11.5–17)
EST. AVERAGE GLUCOSE BLD GHB EST-MCNC: 122.6 MG/DL
GFR SERPLBLD CREATININE-BSD FMLA CKD-EPI: >60 MLS/MIN/1.73/M2
GLOBULIN SER-MCNC: 3.8 GM/DL (ref 2.4–3.5)
GLUCOSE SERPL-MCNC: 98 MG/DL (ref 74–100)
HBA1C MFR BLD: 5.9 %
HCT VFR BLD AUTO: 40.6 % (ref 37–47)
HDLC SERPL-MCNC: 32 MG/DL (ref 35–60)
HGB BLD-MCNC: 12.9 G/DL (ref 12–16)
IMM GRANULOCYTES # BLD AUTO: 0.05 X10(3)/MCL (ref 0–0.04)
IMM GRANULOCYTES NFR BLD AUTO: 0.6 %
LDLC SERPL CALC-MCNC: 52 MG/DL (ref 50–140)
LYMPHOCYTES # BLD AUTO: 3.12 X10(3)/MCL (ref 0.6–4.6)
LYMPHOCYTES NFR BLD AUTO: 40.2 %
MCH RBC QN AUTO: 24.5 PG (ref 27–31)
MCHC RBC AUTO-ENTMCNC: 31.8 G/DL (ref 33–36)
MCV RBC AUTO: 77 FL (ref 80–94)
MONOCYTES # BLD AUTO: 0.65 X10(3)/MCL (ref 0.1–1.3)
MONOCYTES NFR BLD AUTO: 8.4 %
NEUTROPHILS # BLD AUTO: 3.55 X10(3)/MCL (ref 2.1–9.2)
NEUTROPHILS NFR BLD AUTO: 45.7 %
NRBC BLD AUTO-RTO: 0 %
PLATELET # BLD AUTO: 428 X10(3)/MCL (ref 130–400)
PMV BLD AUTO: 9.2 FL (ref 7.4–10.4)
POTASSIUM SERPL-SCNC: 4.8 MMOL/L (ref 3.5–5.1)
PROT SERPL-MCNC: 7.1 GM/DL (ref 6.4–8.3)
RBC # BLD AUTO: 5.27 X10(6)/MCL (ref 4.2–5.4)
SODIUM SERPL-SCNC: 144 MMOL/L (ref 136–145)
TRIGL SERPL-MCNC: 115 MG/DL (ref 37–140)
TSH SERPL-ACNC: 1.35 UIU/ML (ref 0.35–4.94)
VLDLC SERPL CALC-MCNC: 23 MG/DL
WBC # SPEC AUTO: 7.76 X10(3)/MCL (ref 4.5–11.5)

## 2024-03-04 PROCEDURE — 36415 COLL VENOUS BLD VENIPUNCTURE: CPT

## 2024-03-04 PROCEDURE — 80053 COMPREHEN METABOLIC PANEL: CPT

## 2024-03-04 PROCEDURE — 84443 ASSAY THYROID STIM HORMONE: CPT

## 2024-03-04 PROCEDURE — 83036 HEMOGLOBIN GLYCOSYLATED A1C: CPT

## 2024-03-04 PROCEDURE — 80061 LIPID PANEL: CPT

## 2024-03-04 PROCEDURE — 82306 VITAMIN D 25 HYDROXY: CPT

## 2024-03-04 PROCEDURE — 85025 COMPLETE CBC W/AUTO DIFF WBC: CPT

## 2024-03-12 ENCOUNTER — HOSPITAL ENCOUNTER (OUTPATIENT)
Dept: RADIOLOGY | Facility: CLINIC | Age: 59
Discharge: HOME | End: 2024-03-12
Attending: ORTHOPAEDIC SURGERY
Payer: COMMERCIAL

## 2024-03-12 ENCOUNTER — OFFICE VISIT (OUTPATIENT)
Dept: ORTHOPEDICS | Facility: CLINIC | Age: 59
End: 2024-03-12
Payer: COMMERCIAL

## 2024-03-12 VITALS
HEIGHT: 66 IN | BODY MASS INDEX: 45.16 KG/M2 | HEART RATE: 105 BPM | DIASTOLIC BLOOD PRESSURE: 90 MMHG | WEIGHT: 281 LBS | SYSTOLIC BLOOD PRESSURE: 138 MMHG

## 2024-03-12 DIAGNOSIS — M17.12 PRIMARY OSTEOARTHRITIS OF LEFT KNEE: ICD-10-CM

## 2024-03-12 DIAGNOSIS — M17.11 PRIMARY OSTEOARTHRITIS OF RIGHT KNEE: Primary | ICD-10-CM

## 2024-03-12 PROCEDURE — 3080F DIAST BP >= 90 MM HG: CPT | Mod: CPTII,,, | Performed by: NURSE PRACTITIONER

## 2024-03-12 PROCEDURE — 3075F SYST BP GE 130 - 139MM HG: CPT | Mod: CPTII,,, | Performed by: NURSE PRACTITIONER

## 2024-03-12 PROCEDURE — 3044F HG A1C LEVEL LT 7.0%: CPT | Mod: CPTII,,, | Performed by: NURSE PRACTITIONER

## 2024-03-12 PROCEDURE — 99213 OFFICE O/P EST LOW 20 MIN: CPT | Mod: 25,,, | Performed by: NURSE PRACTITIONER

## 2024-03-12 PROCEDURE — 4010F ACE/ARB THERAPY RXD/TAKEN: CPT | Mod: CPTII,,, | Performed by: NURSE PRACTITIONER

## 2024-03-12 PROCEDURE — 20610 DRAIN/INJ JOINT/BURSA W/O US: CPT | Mod: 50,,, | Performed by: NURSE PRACTITIONER

## 2024-03-12 PROCEDURE — 3008F BODY MASS INDEX DOCD: CPT | Mod: CPTII,,, | Performed by: NURSE PRACTITIONER

## 2024-03-12 PROCEDURE — 1159F MED LIST DOCD IN RCRD: CPT | Mod: CPTII,,, | Performed by: NURSE PRACTITIONER

## 2024-03-12 PROCEDURE — 73564 X-RAY EXAM KNEE 4 OR MORE: CPT | Mod: ,,, | Performed by: ORTHOPAEDIC SURGERY

## 2024-03-12 RX ORDER — MELOXICAM 15 MG/1
15 TABLET ORAL DAILY
Qty: 30 TABLET | Refills: 2 | Status: SHIPPED | OUTPATIENT
Start: 2024-03-12

## 2024-03-12 RX ORDER — BETAMETHASONE SODIUM PHOSPHATE AND BETAMETHASONE ACETATE 3; 3 MG/ML; MG/ML
12 INJECTION, SUSPENSION INTRA-ARTICULAR; INTRALESIONAL; INTRAMUSCULAR; SOFT TISSUE
Status: DISCONTINUED | OUTPATIENT
Start: 2024-03-12 | End: 2024-03-12 | Stop reason: HOSPADM

## 2024-03-12 RX ORDER — LIDOCAINE HYDROCHLORIDE 20 MG/ML
5 INJECTION, SOLUTION EPIDURAL; INFILTRATION; INTRACAUDAL; PERINEURAL
Status: DISCONTINUED | OUTPATIENT
Start: 2024-03-12 | End: 2024-03-12 | Stop reason: HOSPADM

## 2024-03-12 RX ADMIN — BETAMETHASONE SODIUM PHOSPHATE AND BETAMETHASONE ACETATE 12 MG: 3; 3 INJECTION, SUSPENSION INTRA-ARTICULAR; INTRALESIONAL; INTRAMUSCULAR; SOFT TISSUE at 02:03

## 2024-03-12 RX ADMIN — LIDOCAINE HYDROCHLORIDE 5 ML: 20 INJECTION, SOLUTION EPIDURAL; INFILTRATION; INTRACAUDAL; PERINEURAL at 02:03

## 2024-03-12 NOTE — PROGRESS NOTES
Chief Complaint:   Chief Complaint   Patient presents with    Right Knee - Pain     Bilat knee pain worsen after fall in December. Reports increase in pain over the last week. No prior sx. Had inj prior with relief. Taking mobic not minimum relief.        History of present illness: Teresita Brian is a 58 y.o. female, presents to clinic today in regards to bilateral knee pain.  States the right knee is worse than the left.  Patient did sustain a fall back in December.  Had a flare-up at that time.  Pain has somewhat decreased although she continues to have pain.  Pain is located throughout the knees.  Worse in the lateral aspect of the knees.  Currently she is taking Mobic which helps but recently has not giving her much relief.  She is received cortisone injections in the past with good relief and we would like to continue with the today here in clinic.    Past Medical History:   Diagnosis Date    Arthritis     Essential (primary) hypertension     Morbid obesity     Osteoarthritis        Past Surgical History:   Procedure Laterality Date     SECTION      COLONOSCOPY W/ POLYPECTOMY  10/01/2018    TUBAL LIGATION  1998       Current Outpatient Medications   Medication Sig    amlodipine-valsartan (EXFORGE) 5-160 mg per tablet Take 1 tablet by mouth every morning.    aspirin (ECOTRIN) 81 MG EC tablet Take 81 mg by mouth once daily.    meloxicam (MOBIC) 15 MG tablet Take 1 tablet (15 mg total) by mouth once daily.    semaglutide (OZEMPIC) 2 mg/dose (8 mg/3 mL) PnIj Inject 2 mg into the skin once a week.     No current facility-administered medications for this visit.       Review of patient's allergies indicates:   Allergen Reactions    Ace inhibitors Swelling       Family History   Problem Relation Age of Onset    Diabetes Mother     Arthritis Mother     Cancer Mother     Diabetes Sister     Diabetes Brother     Diabetes Brother        Social History     Socioeconomic History    Marital status:     Tobacco Use    Smoking status: Never    Smokeless tobacco: Never   Substance and Sexual Activity    Alcohol use: Never    Drug use: Never    Sexual activity: Yes     Partners: Male           Review of Systems:    Denies fevers, chills, chest pain, shortness of breath. Comprehensive review of systems performed and otherwise negative except as noted in HPI     Physical Examination:    General: awake and alert, no acute distress, healthy appearing  Head and Neck: Head atraumatic/normocephalic. Moist MM  CV: brisk cap refill  Lungs: non-labored breathing, w/o cough or SOB  Skin: no rashes present, warm to touch  Neuro: sensation grossly intact distally       Vital Signs:    Vitals:    03/12/24 1414   BP: (!) 138/90   Pulse: 105       Body mass index is 45.35 kg/m².    Bilateral knees:  Skin warm, dry, intact.  No ecchymosis or erythema noted.  Tenderness located to the lateral aspect of bilateral knees.  Crepitus noted throughout range of motion.    X-rays:  Four views of bilateral knees reviewed.  Arthritis noted within bilateral knees with right being worse than the left noting narrowing to the lateral aspect of the right knee greater than the left with narrowing throughout tricompartments of bilateral knees and osteophytes.     Assessment::  Primary osteoarthritis bilateral knees    Plan:  Patient presents to clinic today in regards to bilateral knee pain.  Does have a longstanding history of osteoarthritis.  Recently sustain a fall which did increase her pain.  We will give her bilateral cortisone injections to help decrease his pain today here in clinic.  Also a refill of her Mobic.  Patient will call if needing further treatment with injections as we did speak about different types of injections.    This note was created using [employer] voice recognition software that occasionally misinterpreted phrases or words.    Consult note is delivered via Epic messaging service.

## 2024-03-12 NOTE — PROCEDURES
Large Joint Aspiration/Injection: bilateral knee    Date/Time: 3/12/2024 2:30 PM    Performed by: Sasha Flood FNP  Authorized by: Sasha Flood FNP    Consent Done?:  Yes (Verbal)  Indications:  Arthritis and pain  Timeout: prior to procedure the correct patient, procedure, and site was verified    Prep: patient was prepped and draped in usual sterile fashion    Local anesthesia used?: No      Details:  Needle Size:  22 G  Ultrasonic Guidance for needle placement?: No    Approach:  Anterolateral  Location:  Knee  Laterality:  Bilateral  Site:  Bilateral knee  Medications (Right):  5 mL LIDOcaine (PF) 20 mg/mL (2%) 20 mg/mL (2 %); 12 mg betamethasone acetate-betamethasone sodium phosphate 6 mg/mL  Medications (Left):  5 mL LIDOcaine (PF) 20 mg/mL (2%) 20 mg/mL (2 %); 12 mg betamethasone acetate-betamethasone sodium phosphate 6 mg/mL  Patient tolerance:  Patient tolerated the procedure well with no immediate complications

## 2024-03-15 ENCOUNTER — HOSPITAL ENCOUNTER (OUTPATIENT)
Dept: RADIOLOGY | Facility: HOSPITAL | Age: 59
Discharge: HOME | End: 2024-03-15
Attending: FAMILY MEDICINE
Payer: COMMERCIAL

## 2024-03-15 ENCOUNTER — CLINICAL SUPPORT (OUTPATIENT)
Dept: BARIATRICS | Facility: HOSPITAL | Age: 59
End: 2024-03-15
Payer: COMMERCIAL

## 2024-03-15 VITALS
HEIGHT: 66 IN | WEIGHT: 289 LBS | HEART RATE: 100 BPM | BODY MASS INDEX: 46.45 KG/M2 | DIASTOLIC BLOOD PRESSURE: 88 MMHG | SYSTOLIC BLOOD PRESSURE: 150 MMHG

## 2024-03-15 DIAGNOSIS — E11.9 TYPE 2 DIABETES MELLITUS WITHOUT COMPLICATION, WITHOUT LONG-TERM CURRENT USE OF INSULIN: Primary | ICD-10-CM

## 2024-03-15 DIAGNOSIS — Z71.3 WEIGHT LOSS COUNSELING, ENCOUNTER FOR: Primary | ICD-10-CM

## 2024-03-15 DIAGNOSIS — Z12.31 ENCOUNTER FOR SCREENING MAMMOGRAM FOR BREAST CANCER: ICD-10-CM

## 2024-03-15 PROCEDURE — 77067 SCR MAMMO BI INCL CAD: CPT | Mod: TC

## 2024-03-15 PROCEDURE — 94200024 HC BARIATRIC CONSULT - 30 MINS

## 2024-03-15 PROCEDURE — 77063 BREAST TOMOSYNTHESIS BI: CPT | Mod: 26,,, | Performed by: STUDENT IN AN ORGANIZED HEALTH CARE EDUCATION/TRAINING PROGRAM

## 2024-03-15 PROCEDURE — 77067 SCR MAMMO BI INCL CAD: CPT | Mod: 26,,, | Performed by: STUDENT IN AN ORGANIZED HEALTH CARE EDUCATION/TRAINING PROGRAM

## 2024-03-15 NOTE — PROGRESS NOTES
"Teresita Brian presents today for accountability nurse visit.    Blood pressure (!) 150/88, pulse 100, height 5' 6" (1.676 m), weight 131.1 kg (289 lb).    Weight History:  3/14/23 Wt 320.0 BF 55.3 BMI 51.6  4/24/23 Wt 315.8ln, BF 56.9%, BMI 51.0  5/22/23 Wt 314.0 BF 52.3% BMI 50.7  6/13/23 Wt 312.0 BF 55.4%, BMI 50.4  7/25/23 Wt 303.6 BF 52.7% BMI 49.0  8/18/23 Wt 301.0 BF 51.8% BMI 48.6  9/15/23 Wt 296.0 BF 52.2%, BMI 47.8  10/4/23 Wt 292.3, BF 48.8%, BMI 47.2  11/3/2023 Wt 285.5, BF 53.2%, BMI 46.1  12/1/2023 Wt 284.0, BF 51.2%, BMI 45.8  1/9/2024 Wt 284.2, BF 53.5%, BMI 45.9  2/16/2024 Wt 281.0, BF 52.7%, BMI 45.4   Medication:  Desires refill of Ozempic.  Refill request will be sent to Dr. Persaud.  Educational material on medication precautions and side effects reviewed.     Behavior Modification Plan at last visit:  Patient is currently going to the gym twice a week.  Riding the bike for 20-25 min and weight training circuit.   Plan:   Gradually increase bike riding time by 1-2 min per week.    Add 3rd day of bike  Continue strength training 2 days per week    Nutrition   Dietary Plan at last visit: Decrease sodium intake.  Patient's son is getting  and she has not had time to meal prep.  Eating take out frequently.   24H Recall  B:  Fasting for lent on Fridays, greek yogurt and apple  L:  Grilled shrimp salad  D:  smothered okra with crab and rice  S:  none    Plan:  Fueling well on the go handout given   Will look at [employer] and [employer].   Patient will follow dietary advice from Janeen Lawson RD.    Follow-up visit in 4 weeks with Provider and Bariatric Team    Patricia Bhatt LPN      "

## 2024-03-16 RX ORDER — SEMAGLUTIDE 2.68 MG/ML
2 INJECTION, SOLUTION SUBCUTANEOUS WEEKLY
Qty: 3 ML | Refills: 0 | Status: SHIPPED | OUTPATIENT
Start: 2024-03-16

## 2024-10-30 ENCOUNTER — OFFICE VISIT (OUTPATIENT)
Dept: ORTHOPEDICS | Facility: CLINIC | Age: 59
End: 2024-10-30
Payer: COMMERCIAL

## 2024-10-30 VITALS
SYSTOLIC BLOOD PRESSURE: 137 MMHG | HEIGHT: 66 IN | BODY MASS INDEX: 47.09 KG/M2 | DIASTOLIC BLOOD PRESSURE: 92 MMHG | HEART RATE: 110 BPM | WEIGHT: 293 LBS

## 2024-10-30 DIAGNOSIS — M17.11 PRIMARY OSTEOARTHRITIS OF RIGHT KNEE: Primary | ICD-10-CM

## 2024-10-30 PROCEDURE — 20610 DRAIN/INJ JOINT/BURSA W/O US: CPT | Mod: RT,,, | Performed by: NURSE PRACTITIONER

## 2024-10-30 PROCEDURE — 4010F ACE/ARB THERAPY RXD/TAKEN: CPT | Mod: CPTII,,, | Performed by: NURSE PRACTITIONER

## 2024-10-30 PROCEDURE — 3044F HG A1C LEVEL LT 7.0%: CPT | Mod: CPTII,,, | Performed by: NURSE PRACTITIONER

## 2024-10-30 PROCEDURE — 3008F BODY MASS INDEX DOCD: CPT | Mod: CPTII,,, | Performed by: NURSE PRACTITIONER

## 2024-10-30 PROCEDURE — 1159F MED LIST DOCD IN RCRD: CPT | Mod: CPTII,,, | Performed by: NURSE PRACTITIONER

## 2024-10-30 PROCEDURE — 3080F DIAST BP >= 90 MM HG: CPT | Mod: CPTII,,, | Performed by: NURSE PRACTITIONER

## 2024-10-30 PROCEDURE — 3075F SYST BP GE 130 - 139MM HG: CPT | Mod: CPTII,,, | Performed by: NURSE PRACTITIONER

## 2024-10-30 PROCEDURE — 99213 OFFICE O/P EST LOW 20 MIN: CPT | Mod: 25,,, | Performed by: NURSE PRACTITIONER

## 2024-10-30 RX ORDER — GABAPENTIN 300 MG/1
CAPSULE ORAL
COMMUNITY
Start: 2024-10-29 | End: 2025-01-11

## 2024-10-30 RX ORDER — BETAMETHASONE SODIUM PHOSPHATE AND BETAMETHASONE ACETATE 3; 3 MG/ML; MG/ML
12 INJECTION, SUSPENSION INTRA-ARTICULAR; INTRALESIONAL; INTRAMUSCULAR; SOFT TISSUE
Status: DISCONTINUED | OUTPATIENT
Start: 2024-10-30 | End: 2024-10-30 | Stop reason: HOSPADM

## 2024-10-30 RX ORDER — LIDOCAINE HYDROCHLORIDE 20 MG/ML
5 INJECTION, SOLUTION EPIDURAL; INFILTRATION; INTRACAUDAL; PERINEURAL
Status: DISCONTINUED | OUTPATIENT
Start: 2024-10-30 | End: 2024-10-30 | Stop reason: HOSPADM

## 2024-10-30 RX ADMIN — LIDOCAINE HYDROCHLORIDE 5 ML: 20 INJECTION, SOLUTION EPIDURAL; INFILTRATION; INTRACAUDAL; PERINEURAL at 03:10

## 2024-10-30 RX ADMIN — BETAMETHASONE SODIUM PHOSPHATE AND BETAMETHASONE ACETATE 12 MG: 3; 3 INJECTION, SUSPENSION INTRA-ARTICULAR; INTRALESIONAL; INTRAMUSCULAR; SOFT TISSUE at 03:10

## 2024-12-10 ENCOUNTER — TELEPHONE (OUTPATIENT)
Dept: ORTHOPEDICS | Facility: CLINIC | Age: 59
End: 2024-12-10
Payer: COMMERCIAL

## 2024-12-10 NOTE — TELEPHONE ENCOUNTER
Patient called to ask if she could get a handi-cap. I let her know she can  paper at her convince.

## 2025-02-04 ENCOUNTER — OFFICE VISIT (OUTPATIENT)
Dept: ORTHOPEDICS | Facility: CLINIC | Age: 60
End: 2025-02-04
Payer: COMMERCIAL

## 2025-02-04 VITALS
SYSTOLIC BLOOD PRESSURE: 140 MMHG | HEART RATE: 102 BPM | HEIGHT: 66 IN | BODY MASS INDEX: 47.09 KG/M2 | DIASTOLIC BLOOD PRESSURE: 91 MMHG | WEIGHT: 293 LBS

## 2025-02-04 DIAGNOSIS — M17.11 PRIMARY OSTEOARTHRITIS OF RIGHT KNEE: Primary | ICD-10-CM

## 2025-02-04 PROCEDURE — 99214 OFFICE O/P EST MOD 30 MIN: CPT | Mod: 25,,, | Performed by: ORTHOPAEDIC SURGERY

## 2025-02-04 PROCEDURE — 3008F BODY MASS INDEX DOCD: CPT | Mod: CPTII,,, | Performed by: ORTHOPAEDIC SURGERY

## 2025-02-04 PROCEDURE — 3080F DIAST BP >= 90 MM HG: CPT | Mod: CPTII,,, | Performed by: ORTHOPAEDIC SURGERY

## 2025-02-04 PROCEDURE — 3077F SYST BP >= 140 MM HG: CPT | Mod: CPTII,,, | Performed by: ORTHOPAEDIC SURGERY

## 2025-02-04 PROCEDURE — 4010F ACE/ARB THERAPY RXD/TAKEN: CPT | Mod: CPTII,,, | Performed by: ORTHOPAEDIC SURGERY

## 2025-02-04 PROCEDURE — 1159F MED LIST DOCD IN RCRD: CPT | Mod: CPTII,,, | Performed by: ORTHOPAEDIC SURGERY

## 2025-02-04 PROCEDURE — 20610 DRAIN/INJ JOINT/BURSA W/O US: CPT | Mod: RT,,, | Performed by: ORTHOPAEDIC SURGERY

## 2025-02-04 RX ORDER — LIDOCAINE HYDROCHLORIDE 20 MG/ML
5 INJECTION, SOLUTION EPIDURAL; INFILTRATION; INTRACAUDAL; PERINEURAL
Status: DISCONTINUED | OUTPATIENT
Start: 2025-02-04 | End: 2025-02-04 | Stop reason: HOSPADM

## 2025-02-04 RX ORDER — TIRZEPATIDE 5 MG/.5ML
5 INJECTION, SOLUTION SUBCUTANEOUS
COMMUNITY

## 2025-02-04 RX ORDER — BETAMETHASONE SODIUM PHOSPHATE AND BETAMETHASONE ACETATE 3; 3 MG/ML; MG/ML
12 INJECTION, SUSPENSION INTRA-ARTICULAR; INTRALESIONAL; INTRAMUSCULAR; SOFT TISSUE
Status: DISCONTINUED | OUTPATIENT
Start: 2025-02-04 | End: 2025-02-04 | Stop reason: HOSPADM

## 2025-02-04 RX ADMIN — LIDOCAINE HYDROCHLORIDE 5 ML: 20 INJECTION, SOLUTION EPIDURAL; INFILTRATION; INTRACAUDAL; PERINEURAL at 01:02

## 2025-02-04 RX ADMIN — BETAMETHASONE SODIUM PHOSPHATE AND BETAMETHASONE ACETATE 12 MG: 3; 3 INJECTION, SUSPENSION INTRA-ARTICULAR; INTRALESIONAL; INTRAMUSCULAR; SOFT TISSUE at 01:02

## 2025-02-04 NOTE — PROGRESS NOTES
Chief Complaint:   Chief Complaint   Patient presents with    Right Knee - Follow-up     Right knee pain - Cortisone inj 10/30/24 - Patient states that the injection gave her relief for about 2 days. Reports swelling. Describes pain as throbbing. Patient is not currently in physical therapy.        History of present illness:    History of Present Illness  The patient presents for evaluation of right knee pain and weight management.    She reports persistent discomfort in her right knee, which was previously treated with a cortisone injection 3 months ago. The relief from the injection was short-lived, lasting only 2 days. This was her first experience with such a treatment, and she has not received any other types of injections, including rooster comb or gel injections, in the past. The severity of the pain is significant enough to cause considerable distress.    She has been making efforts towards weight loss. She was previously on Ozempic, which initially showed promising results. However, she has recently switched to Mounjaro and has been on this new medication for a few weeks.    MEDICATIONS  Current: Mounjaro  Past: Ozempic    Past Medical History:   Diagnosis Date    Arthritis     Essential (primary) hypertension     Morbid obesity     Osteoarthritis        Past Surgical History:   Procedure Laterality Date     SECTION      COLONOSCOPY W/ POLYPECTOMY  10/01/2018    TUBAL LIGATION  1998       Current Outpatient Medications   Medication Sig    amlodipine-valsartan (EXFORGE) 5-160 mg per tablet Take 1 tablet by mouth every morning.    aspirin (ECOTRIN) 81 MG EC tablet Take 81 mg by mouth once daily.    gabapentin (NEURONTIN) 300 MG capsule Take by mouth.    MOUNJARO 5 mg/0.5 mL PnIj Inject 5 mg into the skin every 7 days.     No current facility-administered medications for this visit.       Review of patient's allergies indicates:   Allergen Reactions    Ace inhibitors Swelling       Family  History   Problem Relation Name Age of Onset    Diabetes Mother Aleyda Du     Arthritis Mother Aleyda Du     Cancer Mother Aleyda Du     Diabetes Sister Michelle Du     Diabetes Brother Karthik Du     Diabetes Brother Grey Du        Social History     Socioeconomic History    Marital status:    Tobacco Use    Smoking status: Never    Smokeless tobacco: Never   Substance and Sexual Activity    Alcohol use: Never    Drug use: Never    Sexual activity: Yes     Partners: Male           Review of Systems:    Constitution: Negative for chills, fever, and sweats.  Negative for unexplained weight loss.    HENT:  Negative for headaches and blurry vision.    Cardiovascular:Negative for chest pain or irregular heart beat. Negative for hypertension.    Respiratory:  Negative for cough and shortness of breath.    Gastrointestinal: Negative for abdominal pain, heartburn, melena, nausea, and vomitting.    Genitourinary:  Negative bladder incontinence and dysuria.    Musculoskeletal:  See HPI    Neurological: Negative for numbness.    Psychiatric/Behavioral: Negative for depression.  The patient is not nervous/anxious.      Endocrine: Negative for polyuria    Hematologic/Lymphatic: Negative for bleeding problem.  Does not bruise/bleed easily.    Skin: Negative for poor would healing and rash      Physical Examination:    Vital Signs:    Vitals:    02/04/25 1317   BP: (!) 140/91   Pulse: 102       Body mass index is 48.61 kg/m².    General: No acute distress, alert and oriented, healthy appearing    HEENT: Head is atraumatic, mucous membranes are moist    Neck: Supples, no JVD    Cardiovascular: Palpable dorsalis pedis and posterior tibial pulses, regular rate and rhythm to those pulses    Lungs: Breathing non-labored    Skin: no rashes appreciated    Neurologic: Can flex and extend knees, ankles, and toes. Sensation is grossly intact    Right knee:  Patient has crepitus range of motion.  She has a varus  alignment that is correctable.  Can get to extension of 5.  Flexion of 115.     X-rays:      Assessment::  Right knee osteoarthritis    Plan:  Discussed all treatment with the patient patient with end-stage osteoarthritis of the right knee.  Last steroid injection failed to relieve her symptoms.  She has had good relief in the past.  We will try again to see if that has a 1 time injection that has failed to get to the right spot versus her steroid injections not working for her arthritis.  Long term, she is headed for a knee replacement.  We would also tried viscosupplementation.  She will contact us if this injection does not work we will get her approved for Synvisc.  Otherwise we will see him back in 3 months.  Continue working on weight loss.    This note was generated with the assistance of ambient listening technology. Verbal consent was obtained by the patient and accompanying visitor(s) for the recording of patient appointment to facilitate this note. I attest to having reviewed and edited the generated note for accuracy, though some syntax or spelling errors may persist. Please contact the author of this note for any clarification.      This note was created using [employer] voice recognition software that occasionally misinterpreted phrases or words.    Consult note is delivered via Epic messaging service.

## 2025-02-04 NOTE — PROCEDURES
Large Joint Aspiration/Injection: R knee    Date/Time: 2/4/2025 1:15 PM    Performed by: Nate Arnold MD  Authorized by: Nate Arnold MD    Consent Done?:  Yes (Verbal)  Indications:  Arthritis  Timeout: prior to procedure the correct patient, procedure, and site was verified    Prep: patient was prepped and draped in usual sterile fashion      Details:  Needle Size:  22 G  Approach:  Anterolateral  Location:  Knee  Site:  R knee  Medications:  5 mL LIDOcaine (PF) 20 mg/mL (2%) 20 mg/mL (2 %); 12 mg betamethasone acetate-betamethasone sodium phosphate 6 mg/mL

## 2025-04-24 ENCOUNTER — HOSPITAL ENCOUNTER (OUTPATIENT)
Dept: RADIOLOGY | Facility: HOSPITAL | Age: 60
Discharge: HOME | End: 2025-04-24
Attending: OBSTETRICS & GYNECOLOGY
Payer: COMMERCIAL

## 2025-04-24 DIAGNOSIS — Z12.31 ENCOUNTER FOR SCREENING MAMMOGRAM FOR BREAST CANCER: ICD-10-CM

## 2025-04-24 PROCEDURE — 77063 BREAST TOMOSYNTHESIS BI: CPT | Mod: TC

## 2025-04-24 PROCEDURE — 77063 BREAST TOMOSYNTHESIS BI: CPT | Mod: 26,,, | Performed by: RADIOLOGY

## 2025-04-24 PROCEDURE — 77067 SCR MAMMO BI INCL CAD: CPT | Mod: 26,,, | Performed by: RADIOLOGY

## 2025-05-15 ENCOUNTER — TELEPHONE (OUTPATIENT)
Dept: ORTHOPEDICS | Facility: CLINIC | Age: 60
End: 2025-05-15
Payer: COMMERCIAL

## 2025-05-15 DIAGNOSIS — M17.11 PRIMARY OSTEOARTHRITIS OF RIGHT KNEE: Primary | ICD-10-CM

## 2025-05-15 NOTE — TELEPHONE ENCOUNTER
Patient called and LVM stating that she would like to proceed with getting a right knee synvisc injection at her next appointment on 6/3/25. Patient had a cortisone injection on 2/4/2025.

## 2025-05-16 NOTE — TELEPHONE ENCOUNTER
Per Dr. Arnold's message below I put in the order for right knee synvisc injection at her next appointment.     I called the patient to inform her. Voiced a clear understanding.   She also wants to inform Dr. Arnold that on 5/9/25 she had SI joint cortisone injections.

## 2025-06-03 ENCOUNTER — OFFICE VISIT (OUTPATIENT)
Dept: ORTHOPEDICS | Facility: CLINIC | Age: 60
End: 2025-06-03
Payer: COMMERCIAL

## 2025-06-03 ENCOUNTER — HOSPITAL ENCOUNTER (OUTPATIENT)
Dept: RADIOLOGY | Facility: CLINIC | Age: 60
Discharge: HOME | End: 2025-06-03
Attending: ORTHOPAEDIC SURGERY
Payer: COMMERCIAL

## 2025-06-03 VITALS
WEIGHT: 293 LBS | SYSTOLIC BLOOD PRESSURE: 125 MMHG | DIASTOLIC BLOOD PRESSURE: 89 MMHG | HEIGHT: 66 IN | HEART RATE: 108 BPM | BODY MASS INDEX: 47.09 KG/M2

## 2025-06-03 DIAGNOSIS — M17.11 PRIMARY OSTEOARTHRITIS OF RIGHT KNEE: Primary | ICD-10-CM

## 2025-06-03 DIAGNOSIS — M17.11 PRIMARY OSTEOARTHRITIS OF RIGHT KNEE: ICD-10-CM

## 2025-06-03 PROCEDURE — 3008F BODY MASS INDEX DOCD: CPT | Mod: CPTII,,, | Performed by: ORTHOPAEDIC SURGERY

## 2025-06-03 PROCEDURE — 73562 X-RAY EXAM OF KNEE 3: CPT | Mod: RT,,, | Performed by: ORTHOPAEDIC SURGERY

## 2025-06-03 PROCEDURE — 3079F DIAST BP 80-89 MM HG: CPT | Mod: CPTII,,, | Performed by: ORTHOPAEDIC SURGERY

## 2025-06-03 PROCEDURE — 20610 DRAIN/INJ JOINT/BURSA W/O US: CPT | Mod: RT,,, | Performed by: ORTHOPAEDIC SURGERY

## 2025-06-03 PROCEDURE — 3074F SYST BP LT 130 MM HG: CPT | Mod: CPTII,,, | Performed by: ORTHOPAEDIC SURGERY

## 2025-06-03 PROCEDURE — 99214 OFFICE O/P EST MOD 30 MIN: CPT | Mod: 25,,, | Performed by: ORTHOPAEDIC SURGERY

## 2025-06-03 PROCEDURE — 4010F ACE/ARB THERAPY RXD/TAKEN: CPT | Mod: CPTII,,, | Performed by: ORTHOPAEDIC SURGERY

## 2025-06-03 PROCEDURE — 1159F MED LIST DOCD IN RCRD: CPT | Mod: CPTII,,, | Performed by: ORTHOPAEDIC SURGERY
